# Patient Record
Sex: FEMALE | Race: WHITE | Employment: UNEMPLOYED | URBAN - METROPOLITAN AREA
[De-identification: names, ages, dates, MRNs, and addresses within clinical notes are randomized per-mention and may not be internally consistent; named-entity substitution may affect disease eponyms.]

---

## 2021-02-14 ENCOUNTER — APPOINTMENT (OUTPATIENT)
Dept: GENERAL RADIOLOGY | Age: 56
End: 2021-02-14
Attending: EMERGENCY MEDICINE
Payer: MEDICARE

## 2021-02-14 ENCOUNTER — APPOINTMENT (OUTPATIENT)
Dept: CT IMAGING | Age: 56
End: 2021-02-14
Attending: EMERGENCY MEDICINE
Payer: MEDICARE

## 2021-02-14 ENCOUNTER — HOSPITAL ENCOUNTER (EMERGENCY)
Age: 56
Discharge: HOME | End: 2021-02-15
Attending: EMERGENCY MEDICINE
Payer: MEDICARE

## 2021-02-14 DIAGNOSIS — F19.929: Primary | ICD-10-CM

## 2021-02-14 DIAGNOSIS — F19.10 POLYSUBSTANCE ABUSE (HCC): ICD-10-CM

## 2021-02-14 LAB
ALBUMIN SERPL-MCNC: 4.1 G/DL (ref 3.5–5)
ALBUMIN/GLOB SERPL: 0.9 {RATIO} (ref 1.2–3.5)
ALP SERPL-CCNC: 115 U/L (ref 50–136)
ALT SERPL-CCNC: 16 U/L (ref 12–65)
AMORPH CRY URNS QL MICRO: ABNORMAL
AMPHET UR QL SCN: NEGATIVE
ANION GAP SERPL CALC-SCNC: 3 MMOL/L (ref 7–16)
APPEARANCE UR: ABNORMAL
AST SERPL-CCNC: 17 U/L (ref 15–37)
BACTERIA URNS QL MICRO: ABNORMAL /HPF
BARBITURATES UR QL SCN: NEGATIVE
BENZODIAZ UR QL: POSITIVE
BILIRUB SERPL-MCNC: 0.7 MG/DL (ref 0.2–1.1)
BILIRUB UR QL: ABNORMAL
BUN SERPL-MCNC: 9 MG/DL (ref 6–23)
CALCIUM SERPL-MCNC: 10.1 MG/DL (ref 8.3–10.4)
CANNABINOIDS UR QL SCN: POSITIVE
CHLORIDE SERPL-SCNC: 109 MMOL/L (ref 98–107)
CO2 SERPL-SCNC: 30 MMOL/L (ref 21–32)
COCAINE UR QL SCN: POSITIVE
COLOR UR: YELLOW
CREAT SERPL-MCNC: 0.84 MG/DL (ref 0.6–1)
EPI CELLS #/AREA URNS HPF: ABNORMAL /HPF
ERYTHROCYTE [DISTWIDTH] IN BLOOD BY AUTOMATED COUNT: 13.5 % (ref 11.9–14.6)
ETHANOL SERPL-MCNC: <3 MG/DL
GLOBULIN SER CALC-MCNC: 4.6 G/DL (ref 2.3–3.5)
GLUCOSE BLD STRIP.AUTO-MCNC: 111 MG/DL (ref 65–100)
GLUCOSE SERPL-MCNC: 97 MG/DL (ref 65–100)
GLUCOSE UR STRIP.AUTO-MCNC: NEGATIVE MG/DL
HCG UR QL: NEGATIVE
HCT VFR BLD AUTO: 44.5 % (ref 35.8–46.3)
HGB BLD-MCNC: 14.2 G/DL (ref 11.7–15.4)
HGB UR QL STRIP: ABNORMAL
KETONES UR QL STRIP.AUTO: ABNORMAL MG/DL
LEUKOCYTE ESTERASE UR QL STRIP.AUTO: ABNORMAL
LIPASE SERPL-CCNC: 80 U/L (ref 73–393)
MCH RBC QN AUTO: 28.5 PG (ref 26.1–32.9)
MCHC RBC AUTO-ENTMCNC: 31.9 G/DL (ref 31.4–35)
MCV RBC AUTO: 89.4 FL (ref 79.6–97.8)
METHADONE UR QL: NEGATIVE
NITRITE UR QL STRIP.AUTO: NEGATIVE
NRBC # BLD: 0 K/UL (ref 0–0.2)
OPIATES UR QL: POSITIVE
OTHER OBSERVATIONS,UCOM: ABNORMAL
PCP UR QL: NEGATIVE
PH UR STRIP: 5.5 [PH] (ref 5–9)
PLATELET # BLD AUTO: 217 K/UL (ref 150–450)
PMV BLD AUTO: 9.5 FL (ref 9.4–12.3)
POTASSIUM SERPL-SCNC: 3.6 MMOL/L (ref 3.5–5.1)
PROT SERPL-MCNC: 8.7 G/DL (ref 6.3–8.2)
PROT UR STRIP-MCNC: NEGATIVE MG/DL
RBC # BLD AUTO: 4.98 M/UL (ref 4.05–5.2)
RBC #/AREA URNS HPF: ABNORMAL /HPF
SODIUM SERPL-SCNC: 142 MMOL/L (ref 136–145)
SP GR UR REFRACTOMETRY: >1.03 (ref 1–1.02)
UROBILINOGEN UR QL STRIP.AUTO: 1 EU/DL (ref 0.2–1)
WBC # BLD AUTO: 10.5 K/UL (ref 4.3–11.1)
WBC URNS QL MICRO: ABNORMAL /HPF

## 2021-02-14 PROCEDURE — 87086 URINE CULTURE/COLONY COUNT: CPT

## 2021-02-14 PROCEDURE — 93005 ELECTROCARDIOGRAM TRACING: CPT | Performed by: EMERGENCY MEDICINE

## 2021-02-14 PROCEDURE — 82962 GLUCOSE BLOOD TEST: CPT

## 2021-02-14 PROCEDURE — 82077 ASSAY SPEC XCP UR&BREATH IA: CPT

## 2021-02-14 PROCEDURE — 81025 URINE PREGNANCY TEST: CPT

## 2021-02-14 PROCEDURE — 81003 URINALYSIS AUTO W/O SCOPE: CPT

## 2021-02-14 PROCEDURE — 70450 CT HEAD/BRAIN W/O DYE: CPT

## 2021-02-14 PROCEDURE — 99285 EMERGENCY DEPT VISIT HI MDM: CPT

## 2021-02-14 PROCEDURE — 85027 COMPLETE CBC AUTOMATED: CPT

## 2021-02-14 PROCEDURE — 80053 COMPREHEN METABOLIC PANEL: CPT

## 2021-02-14 PROCEDURE — 71045 X-RAY EXAM CHEST 1 VIEW: CPT

## 2021-02-14 PROCEDURE — 80307 DRUG TEST PRSMV CHEM ANLYZR: CPT

## 2021-02-14 PROCEDURE — 83690 ASSAY OF LIPASE: CPT

## 2021-02-15 VITALS
TEMPERATURE: 98.1 F | RESPIRATION RATE: 23 BRPM | WEIGHT: 150 LBS | DIASTOLIC BLOOD PRESSURE: 57 MMHG | OXYGEN SATURATION: 96 % | HEIGHT: 65 IN | SYSTOLIC BLOOD PRESSURE: 110 MMHG | BODY MASS INDEX: 24.99 KG/M2 | HEART RATE: 65 BPM

## 2021-02-15 LAB
ATRIAL RATE: 78 BPM
CALCULATED P AXIS, ECG09: 59 DEGREES
CALCULATED R AXIS, ECG10: 57 DEGREES
CALCULATED T AXIS, ECG11: 28 DEGREES
DIAGNOSIS, 93000: NORMAL
P-R INTERVAL, ECG05: 156 MS
Q-T INTERVAL, ECG07: 354 MS
QRS DURATION, ECG06: 74 MS
QTC CALCULATION (BEZET), ECG08: 403 MS
VENTRICULAR RATE, ECG03: 78 BPM

## 2021-02-15 PROCEDURE — 74011250637 HC RX REV CODE- 250/637: Performed by: EMERGENCY MEDICINE

## 2021-02-15 RX ORDER — CEFPODOXIME PROXETIL 200 MG/1
200 TABLET, FILM COATED ORAL
Status: COMPLETED | OUTPATIENT
Start: 2021-02-15 | End: 2021-02-15

## 2021-02-15 RX ADMIN — CEFPODOXIME PROXETIL 200 MG: 200 TABLET, FILM COATED ORAL at 00:12

## 2021-02-15 NOTE — ED NOTES
I have reviewed discharge instructions with the patient. The patient verbalized understanding. Patient left ED via Discharge Method: ambulatory to Home with family. Opportunity for questions and clarification provided. Patient given 0 scripts. To continue your aftercare when you leave the hospital, you may receive an automated call from our care team to check in on how you are doing. This is a free service and part of our promise to provide the best care and service to meet your aftercare needs.  If you have questions, or wish to unsubscribe from this service please call [phone]. Thank you for Choosing our [facility] Emergency Department.

## 2021-02-15 NOTE — DISCHARGE INSTRUCTIONS
Follow up with your pcp to discuss substance use disorder and plan for treatment. [facility] to [facility] as well. Return if worse, any concerns.

## 2021-02-15 NOTE — ED PROVIDER NOTES
The history is provided by the patient. Altered mental status   This is a new problem. Episode onset: unknown. The problem has been gradually improving. Associated symptoms include confusion. Pertinent negatives include no weakness and no numbness. Mental status baseline is normal.         No past medical history on file. No past surgical history on file. No family history on file. Social History     Socioeconomic History    Marital status:      Spouse name: Not on file    Number of children: Not on file    Years of education: Not on file    Highest education level: Not on file   Occupational History    Not on file   Social Needs    Financial resource strain: Not on file    Food insecurity     Worry: Not on file     Inability: Not on file    Transportation needs     Medical: Not on file     Non-medical: Not on file   Tobacco Use    Smoking status: Not on file   Substance and Sexual Activity    Alcohol use: Not on file    Drug use: Not on file    Sexual activity: Not on file   Lifestyle    Physical activity     Days per week: Not on file     Minutes per session: Not on file    Stress: Not on file   Relationships    Social connections     Talks on phone: Not on file     Gets together: Not on file     Attends [employer] service: Not on file     Active member of club or organization: Not on file     Attends meetings of clubs or organizations: Not on file     Relationship status: Not on file    Intimate partner violence     Fear of current or ex partner: Not on file     Emotionally abused: Not on file     Physically abused: Not on file     Forced sexual activity: Not on file   Other Topics Concern    Not on file   Social History Narrative    Not on file         ALLERGIES: Patient has no known allergies. Review of Systems   Constitutional: Negative for chills and fever. HENT: Negative for congestion, rhinorrhea and sore throat. Eyes: Negative for photophobia and redness. Respiratory: Negative for cough and shortness of breath. Cardiovascular: Negative for chest pain and leg swelling. Gastrointestinal: Positive for abdominal pain. Negative for diarrhea, nausea and vomiting. Endocrine: Negative for polydipsia and polyuria. Genitourinary: Positive for dysuria. Negative for frequency and urgency. Musculoskeletal: Negative for back pain and myalgias. Neurological: Positive for dizziness ( Zant dizziness and movement sensation with movement of head for several months). Negative for speech difficulty, weakness, numbness and headaches. Psychiatric/Behavioral: Positive for confusion. Vitals:    02/14/21 2051   BP: (!) 102/59   Pulse: 90   Resp: 16   Temp: 98.1 °F (36.7 °C)   SpO2: 94%   Weight: 68 kg (150 lb)   Height: 5' 5\" (1.651 m)            Physical Exam  Vitals signs and nursing note reviewed. Constitutional:       General: She is not in acute distress. Appearance: She is well-developed. HENT:      Head: Normocephalic. Eyes:      General: No visual field deficit. Pupils: Pupils are equal, round, and reactive to light. Cardiovascular:      Rate and Rhythm: Normal rate and regular rhythm. Heart sounds: Normal heart sounds. Pulmonary:      Effort: Pulmonary effort is normal.      Breath sounds: Normal breath sounds. Abdominal:      General: There is no distension. Palpations: Abdomen is soft. There is no mass. Tenderness: There is no abdominal tenderness. There is no guarding or rebound. Musculoskeletal: Normal range of motion. Lymphadenopathy:      Cervical: No cervical adenopathy. Skin:     General: Skin is warm and dry. Neurological:      Mental Status: She is alert. GCS: GCS eye subscore is 4. GCS verbal subscore is 5. GCS motor subscore is 6. Cranial Nerves: No cranial nerve deficit, dysarthria or facial asymmetry. Sensory: No sensory deficit. Motor: No weakness.       Coordination: Coordination normal.          MDM  Number of Diagnoses or Management Options  Acute substance intoxication (HCC)  Polysubstance abuse (HCC)  Diagnosis management comments: Patient is now oriented x3.  She exhibits some odd behavior and answer some questions seemingly inappropriately.  No focal deficit.  Check alcohol and UDS, basic labs and abdominal labs given right upper quadrant tenderness, CT scan of the head and monitor.  Check urine for infection as well.    10:47 PM  Spoke with the patient's son about 45 min ago and he reports that she had not returned home from [facility] after leaving with the friend. He just received text messages from that friend with him needing a ride home from the [facility]. His mother was last seen normal about 5 PM. She displayed no focal deficit and her urine drug screen is positive for multiple substances. Patient's son had denied drug use on her apart but she had admitted to she did in a methadone clinic before.    12:24 AM  Patient is arousable by minor stimulus but certainly more somnolent than when she first got here.  Oxygen saturations are 91 to 92% on room air as she snores loudly.  2 L nasal cannula oxygen applied.  Will allow patient to sleep this polysubstance intoxication off.    1:57 AM  Patient is now awake and alert and admits to taking Xanax which she believes was yesterday and taking Norco today.  She denies the cocaine.  She now reports she is a former patient at [facility] and briefly tried to go to [facility] treatment centers to have her Medicaid pay for it but there was a problem with the following of the papers.  I encouraged her to call them Tuesday or [facility] and to try to get back into treatment.  Patient counseled not to take Xanax and opiates as this can be a fatal combination.  I explained that it could cause her to fall asleep stop breathing and end up dead.       Amount and/or Complexity of Data Reviewed  Clinical lab tests: ordered and reviewed  (Results for orders placed or performed during the hospital encounter of 02/14/21  -CBC W/O DIFF       Result                      Value             Ref Range           WBC                         10.5              4.3 - 11.1 K*       RBC                         4.98              4.05 - 5.2 M*       HGB                         14.2              11.7 - 15.4 *       HCT                         44.5              35.8 - 46.3 %       MCV                         89.4              79.6 - 97.8 *       MCH                         28.5              26.1 - 32.9 *       MCHC                        31.9              31.4 - 35.0 *       RDW                         13.5              11.9 - 14.6 %       PLATELET                    217               150 - 450 K/*       MPV                         9.5               9.4 - 12.3 FL       ABSOLUTE NRBC               0.00              0.0 - 0.2 K/*  -METABOLIC PANEL, COMPREHENSIVE       Result                      Value             Ref Range           Sodium                      142               136 - 145 mm*       Potassium                   3.6               3.5 - 5.1 mm*       Chloride                    109 (H)           98 - 107 mmo*       CO2                         30                21 - 32 mmol*       Anion gap                   3 (L)             7 - 16 mmol/L       Glucose                     97                65 - 100 mg/*       BUN                         9                 6 - 23 MG/DL        Creatinine                  0.84              0.6 - 1.0 MG*       GFR est AA                  >60               >60 ml/min/1*       GFR est non-AA              >60               >60 ml/min/1*       Calcium                     10.1              8.3 - 10.4 M*       Bilirubin, total            0.7               0.2 - 1.1 MG*       ALT (SGPT)                  16                12 - 65 U/L         AST (SGOT)                  17                15 - 37 U/L         Alk.  phosphatase            115 50 - 136 U/L        Protein, total              8.7 (H)           6.3 - 8.2 g/*       Albumin                     4.1               3.5 - 5.0 g/*       Globulin                    4.6 (H)           2.3 - 3.5 g/*       A-G Ratio                   0.9 (L)           1.2 - 3.5      -LIPASE       Result                      Value             Ref Range           Lipase                      80                73 - 393 U/L   -ETHYL ALCOHOL       Result                      Value             Ref Range           ALCOHOL(ETHYL),SERUM        <3                MG/DL          -DRUG SCREEN, URINE       Result                      Value             Ref Range           PCP(PHENCYCLIDINE)          Negative                              BENZODIAZEPINES             Positive                              COCAINE                     Positive                              AMPHETAMINES                Negative                              METHADONE                   Negative                              THC (TH-CANNABINOL)         Positive                              OPIATES                     Positive                              BARBITURATES                Negative                         -URINALYSIS W/ RFLX MICROSCOPIC       Result                      Value             Ref Range           Color                       YELLOW                                Appearance                  CLOUDY                                Specific gravity            >1.030 (H)        1.001 - 1.023       pH (UA)                     5.5               5.0 - 9.0           Protein                     Negative          NEG mg/dL           Glucose                     Negative          NEG mg/dL           Ketone                      TRACE (A)         NEG mg/dL           Bilirubin                   SMALL (A)         NEG                 Blood                       SMALL (A)         NEG                 Urobilinogen                1.0               0.2 - 1.0 EU*       Nitrites                    Negative          NEG                 Leukocyte Esterase          SMALL (A)         NEG                 WBC                         0-3               0 /hpf              RBC                         0-3               0 /hpf              Epithelial cells            5-10              0 /hpf              Bacteria                    4+ (H)            0 /hpf              Amorphous Crystals          3+ (H)            0                   Other observations                                            RESULTS VERIFIED MANUALLY  -HCG URINE, QL       Result                      Value             Ref Range           HCG urine, QL               Negative          NEG            -GLUCOSE, POC       Result                      Value             Ref Range           Glucose (POC)               111 (H)           65 - 100 mg/*  )  Tests in the radiology section of CPT®: ordered and reviewed (Ct Head Wo Cont    Result Date: 2/14/2021  Noncontrast CT of the brain. COMPARISON: none INDICATION: Altered mental status, confusion TECHNIQUE: Contiguous axial images were obtained from the skull base through the vertex without IV contrast. Radiation dose reduction techniques were used for this study:  Our CT scanners use one or all of the following: Automated exposure control, adjustment of the mA and/or kVp according to patient's size, iterative reconstruction. FINDINGS: There is no acute intracranial hemorrhage or evidence for acute territorial infarction. There is no mass effect, midline shift or hydrocephalus. There is no extra-axial fluid collection. The cerebellum and brainstem are grossly unremarkable. Included globes appear intact. The paranasal sinuses and the mastoid air cells are aerated. No skull fracture. 1. No CT evidence of acute intracranial process. Xr Chest Port    Result Date: 2/14/2021  Portable chest xray  COMPARISON: none.  INDICATION: Altered mental status FINDINGS: Lungs are underinflated. There is no focal pulmonary consolidation, pneumothorax or pulmonary edema. No large pleural effusion. Cardiomediastinal contour is within normal limits. Surrounding bones are unremarkable.      1. No evidence of acute pulmonary disease.    )    Risk of Complications, Morbidity, and/or Mortality  Presenting problems: moderate  Diagnostic procedures: low  Management options: low    Patient Progress  Patient progress: stable         Procedures

## 2021-02-15 NOTE — ED TRIAGE NOTES
Pt arrives via EMS, mask on. Pt was found with altered mental status in [facility]. Per EMS, [address] employees called after the patient had been wandering the store for about two hours. Employees saw the patient eating oreos and then the patient attempted to get into the wrong vehicle. Pt also had bowel movement on self while in [facility]. Per EMS, vitals are stable. Pt states she feels stressed and she has not been sleeping. Pt states she is only sleeping about 3 hours a night but napping during the day.

## 2021-02-17 LAB
BACTERIA SPEC CULT: NORMAL
SERVICE CMNT-IMP: NORMAL

## 2024-07-25 ENCOUNTER — INITIAL CONSULT (OUTPATIENT)
Age: 59
End: 2024-07-25
Payer: MEDICARE

## 2024-07-25 VITALS
HEIGHT: 65 IN | BODY MASS INDEX: 32.72 KG/M2 | HEART RATE: 56 BPM | DIASTOLIC BLOOD PRESSURE: 70 MMHG | SYSTOLIC BLOOD PRESSURE: 124 MMHG | WEIGHT: 196.4 LBS

## 2024-07-25 DIAGNOSIS — J40 BRONCHITIS: Primary | ICD-10-CM

## 2024-07-25 DIAGNOSIS — R06.02 SHORTNESS OF BREATH: ICD-10-CM

## 2024-07-25 PROCEDURE — G8427 DOCREV CUR MEDS BY ELIG CLIN: HCPCS | Performed by: INTERNAL MEDICINE

## 2024-07-25 PROCEDURE — 3017F COLORECTAL CA SCREEN DOC REV: CPT | Performed by: INTERNAL MEDICINE

## 2024-07-25 PROCEDURE — 4004F PT TOBACCO SCREEN RCVD TLK: CPT | Performed by: INTERNAL MEDICINE

## 2024-07-25 PROCEDURE — 99204 OFFICE O/P NEW MOD 45 MIN: CPT | Performed by: INTERNAL MEDICINE

## 2024-07-25 PROCEDURE — 93000 ELECTROCARDIOGRAM COMPLETE: CPT | Performed by: INTERNAL MEDICINE

## 2024-07-25 PROCEDURE — G8417 CALC BMI ABV UP PARAM F/U: HCPCS | Performed by: INTERNAL MEDICINE

## 2024-07-25 RX ORDER — ALBUTEROL SULFATE 90 UG/1
2 AEROSOL, METERED RESPIRATORY (INHALATION) EVERY 6 HOURS PRN
COMMUNITY
Start: 2022-08-05

## 2024-07-25 RX ORDER — METHOCARBAMOL 750 MG/1
750 TABLET, FILM COATED ORAL 2 TIMES DAILY PRN
COMMUNITY
Start: 2022-05-26

## 2024-07-25 RX ORDER — IPRATROPIUM BROMIDE AND ALBUTEROL SULFATE 2.5; .5 MG/3ML; MG/3ML
SOLUTION RESPIRATORY (INHALATION)
COMMUNITY
Start: 2024-06-17

## 2024-07-25 RX ORDER — ONDANSETRON 4 MG/1
4 TABLET, ORALLY DISINTEGRATING ORAL 3 TIMES DAILY PRN
COMMUNITY
Start: 2023-09-25

## 2024-07-30 ASSESSMENT — ENCOUNTER SYMPTOMS
ABDOMINAL PAIN: 0
SHORTNESS OF BREATH: 0

## 2024-07-30 NOTE — PROGRESS NOTES
function and as well as measure filling pressures.  Will arrange follow-up pending results of her echo.    Personal time spent with chart review, interview/physical examination and documentation totals 46 minutes.         No follow-ups on file.          Thank you for allowing me to participate in this patient's care.  Please call or contact me if there are any questions or concerns regarding the above.      Reinier Riley III, MD  07/30/24  8:29 AM

## 2024-08-07 ENCOUNTER — OFFICE VISIT (OUTPATIENT)
Dept: INTERNAL MEDICINE CLINIC | Facility: CLINIC | Age: 59
End: 2024-08-07
Payer: MEDICARE

## 2024-08-07 VITALS
TEMPERATURE: 98 F | SYSTOLIC BLOOD PRESSURE: 112 MMHG | HEART RATE: 74 BPM | BODY MASS INDEX: 32.82 KG/M2 | OXYGEN SATURATION: 95 % | HEIGHT: 65 IN | WEIGHT: 197 LBS | DIASTOLIC BLOOD PRESSURE: 78 MMHG

## 2024-08-07 DIAGNOSIS — R63.5 ABNORMAL WEIGHT GAIN: ICD-10-CM

## 2024-08-07 DIAGNOSIS — Z12.11 SCREENING FOR COLON CANCER: ICD-10-CM

## 2024-08-07 DIAGNOSIS — Z13.6 SCREENING FOR CARDIOVASCULAR CONDITION: ICD-10-CM

## 2024-08-07 DIAGNOSIS — Z12.31 ENCOUNTER FOR SCREENING MAMMOGRAM FOR MALIGNANT NEOPLASM OF BREAST: ICD-10-CM

## 2024-08-07 DIAGNOSIS — Z00.00 ROUTINE ADULT HEALTH MAINTENANCE: ICD-10-CM

## 2024-08-07 DIAGNOSIS — G47.33 OSA (OBSTRUCTIVE SLEEP APNEA): Primary | ICD-10-CM

## 2024-08-07 PROCEDURE — 3017F COLORECTAL CA SCREEN DOC REV: CPT | Performed by: INTERNAL MEDICINE

## 2024-08-07 PROCEDURE — G8427 DOCREV CUR MEDS BY ELIG CLIN: HCPCS | Performed by: INTERNAL MEDICINE

## 2024-08-07 PROCEDURE — 4004F PT TOBACCO SCREEN RCVD TLK: CPT | Performed by: INTERNAL MEDICINE

## 2024-08-07 PROCEDURE — G8417 CALC BMI ABV UP PARAM F/U: HCPCS | Performed by: INTERNAL MEDICINE

## 2024-08-07 PROCEDURE — 99204 OFFICE O/P NEW MOD 45 MIN: CPT | Performed by: INTERNAL MEDICINE

## 2024-08-07 RX ORDER — BENZONATATE 200 MG/1
200 CAPSULE ORAL 3 TIMES DAILY PRN
COMMUNITY
Start: 2024-06-20

## 2024-08-07 RX ORDER — PEAK FLOW METER
EACH MISCELLANEOUS
COMMUNITY
Start: 2024-06-20

## 2024-08-07 SDOH — ECONOMIC STABILITY: HOUSING INSECURITY
IN THE LAST 12 MONTHS, WAS THERE A TIME WHEN YOU DID NOT HAVE A STEADY PLACE TO SLEEP OR SLEPT IN A SHELTER (INCLUDING NOW)?: NO

## 2024-08-07 SDOH — ECONOMIC STABILITY: INCOME INSECURITY: HOW HARD IS IT FOR YOU TO PAY FOR THE VERY BASICS LIKE FOOD, HOUSING, MEDICAL CARE, AND HEATING?: NOT HARD AT ALL

## 2024-08-07 SDOH — ECONOMIC STABILITY: FOOD INSECURITY: WITHIN THE PAST 12 MONTHS, THE FOOD YOU BOUGHT JUST DIDN'T LAST AND YOU DIDN'T HAVE MONEY TO GET MORE.: NEVER TRUE

## 2024-08-07 SDOH — ECONOMIC STABILITY: FOOD INSECURITY: WITHIN THE PAST 12 MONTHS, YOU WORRIED THAT YOUR FOOD WOULD RUN OUT BEFORE YOU GOT MONEY TO BUY MORE.: NEVER TRUE

## 2024-08-07 ASSESSMENT — PATIENT HEALTH QUESTIONNAIRE - PHQ9
SUM OF ALL RESPONSES TO PHQ9 QUESTIONS 1 & 2: 0
SUM OF ALL RESPONSES TO PHQ QUESTIONS 1-9: 0
2. FEELING DOWN, DEPRESSED OR HOPELESS: NOT AT ALL
SUM OF ALL RESPONSES TO PHQ QUESTIONS 1-9: 0
1. LITTLE INTEREST OR PLEASURE IN DOING THINGS: NOT AT ALL

## 2024-08-07 NOTE — PROGRESS NOTES
Multiple Vitamin (MULTIVITAMIN ADULT PO), Take by mouth, Disp: , Rfl:     ondansetron (ZOFRAN-ODT) 4 MG disintegrating tablet, Take 1 tablet by mouth 3 times daily as needed, Disp: , Rfl:     Allergies   Allergen Reactions    Acetaminophen Other (See Comments)     Does not have an allergy per patient the doctor doesn't want her to have it bc she has a mass on her liver.    Sulfa Antibiotics Nausea And Vomiting    Hydrocodone Other (See Comments)     Tylenol due to liver condition         Review of Systems      Vitals:    08/07/24 1349   BP: 112/78   Pulse: 74   Temp: 98 °F (36.7 °C)   TempSrc: Temporal   SpO2: 95%   Weight: 89.4 kg (197 lb)   Height: 1.651 m (5' 5\")       Wt Readings from Last 3 Encounters:   08/07/24 89.4 kg (197 lb)   07/25/24 89.1 kg (196 lb 6.4 oz)         Physical Exam  Constitutional:       Appearance: Normal appearance. She is not ill-appearing.   HENT:      Head: Normocephalic.      Right Ear: Tympanic membrane normal.      Left Ear: Tympanic membrane normal.      Nose: Nose normal.      Mouth/Throat:      Mouth: Mucous membranes are moist.      Pharynx: Oropharynx is clear.   Eyes:      Extraocular Movements: Extraocular movements intact.      Conjunctiva/sclera: Conjunctivae normal.   Cardiovascular:      Rate and Rhythm: Normal rate and regular rhythm.      Pulses: Normal pulses.      Heart sounds: Normal heart sounds.   Pulmonary:      Effort: Pulmonary effort is normal.      Breath sounds: Normal breath sounds.   Abdominal:      General: Abdomen is flat. Bowel sounds are normal. There is no distension.      Palpations: Abdomen is soft. There is no mass.   Musculoskeletal:      Right lower leg: No edema.      Left lower leg: No edema.   Skin:     Coloration: Skin is not jaundiced.   Neurological:      General: No focal deficit present.      Mental Status: She is alert.   Psychiatric:         Mood and Affect: Mood normal.         Thought Content: Thought content normal.

## 2024-09-03 ENCOUNTER — HOSPITAL ENCOUNTER (OUTPATIENT)
Dept: SLEEP MEDICINE | Age: 59
Discharge: HOME | End: 2024-09-06

## 2024-09-04 ENCOUNTER — TELEPHONE (OUTPATIENT)
Age: 59
End: 2024-09-04

## 2024-09-04 NOTE — TELEPHONE ENCOUNTER
----- Message from Dr. Reinier Riley MD sent at 9/4/2024 11:32 AM EDT -----  Please let patient know that their echo was normal. Patient can follow up as scheduled.

## 2024-10-15 ENCOUNTER — TELEPHONE (OUTPATIENT)
Dept: SLEEP MEDICINE | Age: 59
End: 2024-10-15

## 2024-10-16 ENCOUNTER — OFFICE VISIT (OUTPATIENT)
Dept: SLEEP MEDICINE | Age: 59
End: 2024-10-16
Payer: MEDICARE

## 2024-10-16 VITALS
HEIGHT: 65 IN | HEART RATE: 75 BPM | OXYGEN SATURATION: 94 % | WEIGHT: 188 LBS | RESPIRATION RATE: 14 BRPM | DIASTOLIC BLOOD PRESSURE: 88 MMHG | SYSTOLIC BLOOD PRESSURE: 134 MMHG | BODY MASS INDEX: 31.32 KG/M2

## 2024-10-16 DIAGNOSIS — G47.8 NON-RESTORATIVE SLEEP: ICD-10-CM

## 2024-10-16 DIAGNOSIS — G47.33 OSA (OBSTRUCTIVE SLEEP APNEA): Primary | ICD-10-CM

## 2024-10-16 DIAGNOSIS — G47.10 HYPERSOMNIA: ICD-10-CM

## 2024-10-16 DIAGNOSIS — R51.9 MORNING HEADACHE: ICD-10-CM

## 2024-10-16 DIAGNOSIS — E66.811 OBESITY (BMI 30.0-34.9): ICD-10-CM

## 2024-10-16 DIAGNOSIS — G47.00 PERSISTENT DISORDER OF INITIATING OR MAINTAINING SLEEP: ICD-10-CM

## 2024-10-16 PROCEDURE — G8417 CALC BMI ABV UP PARAM F/U: HCPCS | Performed by: NURSE PRACTITIONER

## 2024-10-16 PROCEDURE — G2211 COMPLEX E/M VISIT ADD ON: HCPCS | Performed by: NURSE PRACTITIONER

## 2024-10-16 PROCEDURE — 4004F PT TOBACCO SCREEN RCVD TLK: CPT | Performed by: NURSE PRACTITIONER

## 2024-10-16 PROCEDURE — 99203 OFFICE O/P NEW LOW 30 MIN: CPT | Performed by: NURSE PRACTITIONER

## 2024-10-16 PROCEDURE — 3017F COLORECTAL CA SCREEN DOC REV: CPT | Performed by: NURSE PRACTITIONER

## 2024-10-16 PROCEDURE — G8484 FLU IMMUNIZE NO ADMIN: HCPCS | Performed by: NURSE PRACTITIONER

## 2024-10-16 PROCEDURE — G8427 DOCREV CUR MEDS BY ELIG CLIN: HCPCS | Performed by: NURSE PRACTITIONER

## 2024-10-16 ASSESSMENT — SLEEP AND FATIGUE QUESTIONNAIRES
ESS TOTAL SCORE: 14
HOW LIKELY ARE YOU TO NOD OFF OR FALL ASLEEP WHILE SITTING AND READING: HIGH CHANCE OF DOZING
HOW LIKELY ARE YOU TO NOD OFF OR FALL ASLEEP WHILE LYING DOWN TO REST IN THE AFTERNOON WHEN CIRCUMSTANCES PERMIT: SLIGHT CHANCE OF DOZING
HOW LIKELY ARE YOU TO NOD OFF OR FALL ASLEEP WHILE SITTING QUIETLY AFTER LUNCH WITHOUT ALCOHOL: MODERATE CHANCE OF DOZING
HOW LIKELY ARE YOU TO NOD OFF OR FALL ASLEEP WHILE SITTING AND TALKING TO SOMEONE: WOULD NEVER DOZE
HOW LIKELY ARE YOU TO NOD OFF OR FALL ASLEEP WHEN YOU ARE A PASSENGER IN A CAR FOR AN HOUR WITHOUT A BREAK: HIGH CHANCE OF DOZING
HOW LIKELY ARE YOU TO NOD OFF OR FALL ASLEEP WHILE SITTING INACTIVE IN A PUBLIC PLACE: MODERATE CHANCE OF DOZING
HOW LIKELY ARE YOU TO NOD OFF OR FALL ASLEEP WHILE WATCHING TV: HIGH CHANCE OF DOZING
HOW LIKELY ARE YOU TO NOD OFF OR FALL ASLEEP IN A CAR, WHILE STOPPED FOR A FEW MINUTES IN TRAFFIC: WOULD NEVER DOZE

## 2024-10-16 NOTE — PROGRESS NOTES
apnea). Diagnoses of Non-restorative sleep, Morning headache, Hypersomnia, Persistent disorder of initiating or maintaining sleep, and Obesity (BMI 30.0-34.9) were also pertinent to this visit.          (  X   )New Set-Up     CPAP machine   (     ) CPAP Unit  ( x    ) Auto CPAP Unit  (     ) BiLevel Unit  (     ) Auto BiLevel Unit  (     ) ASV   (     ) Bilevel ST    (     ) Oxygen Concentrator         Length of need: 12 months    Pressure: 5-15  cmH20  EPR: 2     Starting Ramp Pressure:   cm H20  Ramp Time: min      Patient had a diagnostic Apnea Hypopnea Index (AHI) of :  14.5    *SUPPLIES* Replace all as needed, or per coverage guidelines     Masks Type:    (  x   ) -Full Face Mask (1 per 3 mon)  ( x    ) -Full Mask (1 per month) Interface/Cushion      ( x    ) -Nasal Mask (1 per 3 mon)  ( x    ) - Nasal Mask (1 per month) Interface/Cushion  ( x    ) -Pillow (2 per mon)  ( x    ) -Inzxjbckb (1 per 6 mon)      _________________________________________________________________          Other Supplies:    (  X   )-Huwzbcel (1 per 6 mon)  ( X    )-Uujsyu Tubing (1 per 3 mon)  (  X   )- Disposable Filter (2 per mon)  (   X  )-Oykxbd Humidifier (1 per year)     (  x   )-Xikoeddjo (sometimes used with Full Face Mask) (1 per 6 mos)  (     )-Tubing-without heat (1 per 3 mos)  ( X   )-Non-Disposable Filter (1 per 6 mos)  (   x  )-Water Chamber (1 per 6 mos)  (     )-Humidifier non-heated (1 per 5 yrs)      Signed Date: 10/16/2024  Electronically Signed By: Ricardo K Merck, APRN - CNP              Collaborating Physician: Dr. Mancia     Today's office visit was spent  reviewing test results, prognosis, importance of compliance, education about disease process, benefits of medications, instructions for management of acute flare-ups, and follow up plans.  Total time spent with patient was 35 minutes.    Ricardo Girard,

## 2024-11-05 ENCOUNTER — OFFICE VISIT (OUTPATIENT)
Dept: INTERNAL MEDICINE CLINIC | Facility: CLINIC | Age: 59
End: 2024-11-05

## 2024-11-05 VITALS
OXYGEN SATURATION: 98 % | WEIGHT: 194 LBS | DIASTOLIC BLOOD PRESSURE: 60 MMHG | TEMPERATURE: 99 F | SYSTOLIC BLOOD PRESSURE: 102 MMHG | HEART RATE: 48 BPM | HEIGHT: 65 IN | BODY MASS INDEX: 32.32 KG/M2

## 2024-11-05 DIAGNOSIS — R11.0 NAUSEA: Primary | ICD-10-CM

## 2024-11-05 DIAGNOSIS — F32.A DEPRESSION, UNSPECIFIED DEPRESSION TYPE: ICD-10-CM

## 2024-11-05 DIAGNOSIS — Z12.31 ENCOUNTER FOR SCREENING MAMMOGRAM FOR MALIGNANT NEOPLASM OF BREAST: ICD-10-CM

## 2024-11-05 DIAGNOSIS — G47.33 OSA (OBSTRUCTIVE SLEEP APNEA): ICD-10-CM

## 2024-11-05 DIAGNOSIS — Z13.6 SCREENING FOR CARDIOVASCULAR CONDITION: ICD-10-CM

## 2024-11-05 DIAGNOSIS — M79.7 FIBROMYALGIA: ICD-10-CM

## 2024-11-05 DIAGNOSIS — M54.32 SCIATICA OF LEFT SIDE: ICD-10-CM

## 2024-11-05 DIAGNOSIS — Z00.00 ROUTINE ADULT HEALTH MAINTENANCE: ICD-10-CM

## 2024-11-05 DIAGNOSIS — R63.5 ABNORMAL WEIGHT GAIN: ICD-10-CM

## 2024-11-05 LAB
ALBUMIN SERPL-MCNC: 3.4 G/DL (ref 3.5–5)
ALBUMIN/GLOB SERPL: 1.1 (ref 1–1.9)
ALP SERPL-CCNC: 86 U/L (ref 35–104)
ALT SERPL-CCNC: 18 U/L (ref 8–45)
ANION GAP SERPL CALC-SCNC: 7 MMOL/L (ref 7–16)
AST SERPL-CCNC: 22 U/L (ref 15–37)
BILIRUB SERPL-MCNC: 0.2 MG/DL (ref 0–1.2)
BUN SERPL-MCNC: 12 MG/DL (ref 6–23)
CALCIUM SERPL-MCNC: 9.1 MG/DL (ref 8.8–10.2)
CHLORIDE SERPL-SCNC: 102 MMOL/L (ref 98–107)
CHOLEST SERPL-MCNC: 185 MG/DL (ref 0–200)
CO2 SERPL-SCNC: 31 MMOL/L (ref 20–29)
CREAT SERPL-MCNC: 0.53 MG/DL (ref 0.6–1.1)
ERYTHROCYTE [DISTWIDTH] IN BLOOD BY AUTOMATED COUNT: 14.4 % (ref 11.9–14.6)
GLOBULIN SER CALC-MCNC: 3.2 G/DL (ref 2.3–3.5)
GLUCOSE SERPL-MCNC: 89 MG/DL (ref 70–99)
HCT VFR BLD AUTO: 38.2 % (ref 35.8–46.3)
HCV AB SER QL: NONREACTIVE
HDLC SERPL-MCNC: 44 MG/DL (ref 40–60)
HDLC SERPL: 4.2 (ref 0–5)
HGB BLD-MCNC: 12.3 G/DL (ref 11.7–15.4)
HIV 1+2 AB+HIV1 P24 AG SERPL QL IA: NONREACTIVE
HIV 1/2 RESULT COMMENT: NORMAL
LDLC SERPL CALC-MCNC: 79 MG/DL (ref 0–100)
MCH RBC QN AUTO: 29.7 PG (ref 26.1–32.9)
MCHC RBC AUTO-ENTMCNC: 32.2 G/DL (ref 31.4–35)
MCV RBC AUTO: 92.3 FL (ref 82–102)
NRBC # BLD: 0 K/UL (ref 0–0.2)
PLATELET # BLD AUTO: 182 K/UL (ref 150–450)
PMV BLD AUTO: 10.6 FL (ref 9.4–12.3)
POTASSIUM SERPL-SCNC: 4.1 MMOL/L (ref 3.5–5.1)
PROT SERPL-MCNC: 6.6 G/DL (ref 6.3–8.2)
RBC # BLD AUTO: 4.14 M/UL (ref 4.05–5.2)
SODIUM SERPL-SCNC: 141 MMOL/L (ref 136–145)
TRIGL SERPL-MCNC: 309 MG/DL (ref 0–150)
TSH W FREE THYROID IF ABNORMAL: 1.92 UIU/ML (ref 0.27–4.2)
VLDLC SERPL CALC-MCNC: 62 MG/DL (ref 6–23)
WBC # BLD AUTO: 8.4 K/UL (ref 4.3–11.1)

## 2024-11-05 RX ORDER — DULOXETIN HYDROCHLORIDE 30 MG/1
30 CAPSULE, DELAYED RELEASE ORAL DAILY
Qty: 30 CAPSULE | Refills: 3 | Status: SHIPPED | OUTPATIENT
Start: 2024-11-05

## 2024-11-05 RX ORDER — ONDANSETRON 4 MG/1
4 TABLET, ORALLY DISINTEGRATING ORAL EVERY 8 HOURS PRN
Qty: 15 TABLET | Refills: 5 | Status: SHIPPED | OUTPATIENT
Start: 2024-11-05

## 2024-11-05 ASSESSMENT — ENCOUNTER SYMPTOMS
CHEST TIGHTNESS: 0
WHEEZING: 0
ABDOMINAL PAIN: 0
BLOOD IN STOOL: 0
NAUSEA: 1
COLOR CHANGE: 0
SHORTNESS OF BREATH: 0
BACK PAIN: 1

## 2024-11-05 ASSESSMENT — PATIENT HEALTH QUESTIONNAIRE - PHQ9
SUM OF ALL RESPONSES TO PHQ9 QUESTIONS 1 & 2: 2
SUM OF ALL RESPONSES TO PHQ QUESTIONS 1-9: 2
1. LITTLE INTEREST OR PLEASURE IN DOING THINGS: SEVERAL DAYS
2. FEELING DOWN, DEPRESSED OR HOPELESS: SEVERAL DAYS

## 2024-11-05 ASSESSMENT — LIFESTYLE VARIABLES
HOW OFTEN DO YOU HAVE A DRINK CONTAINING ALCOHOL: MONTHLY OR LESS
HOW MANY STANDARD DRINKS CONTAINING ALCOHOL DO YOU HAVE ON A TYPICAL DAY: 1 OR 2

## 2024-11-05 NOTE — PROGRESS NOTES
Tea was seen today for follow-up, medication refill, back pain and medicare awv.    Diagnoses and all orders for this visit:    Nausea  -     ondansetron (ZOFRAN-ODT) 4 MG disintegrating tablet; Take 1 tablet by mouth every 8 hours as needed for Nausea    Encounter for screening mammogram for malignant neoplasm of breast  -     [facility] DIGITAL SCREEN W OR WO CAD BILATERAL; Future    Sciatica of left side  -     [facility] - Physical Therapy, [facility]    Fibromyalgia  -     [facility] - Physical Therapy, [facility]  -     DULoxetine (CYMBALTA) 30 MG extended release capsule; Take 1 capsule by mouth daily    Depression, unspecified depression type  -     DULoxetine (CYMBALTA) 30 MG extended release capsule; Take 1 capsule by mouth daily    MARCOS (obstructive sleep apnea)  -     HIV 1/2 Ag/Ab, 4TH Generation,W Rflx Confirm  -     Hepatitis C Antibody  -     Lipid Panel  -     CBC  -     Comprehensive Metabolic Panel  -     TSH with Reflex    Routine adult health maintenance  -     HIV 1/2 Ag/Ab, 4TH Generation,W Rflx Confirm  -     Hepatitis C Antibody  -     Lipid Panel  -     CBC  -     Comprehensive Metabolic Panel  -     TSH with Reflex    Screening for cardiovascular condition  -     HIV 1/2 Ag/Ab, 4TH Generation,W Rflx Confirm  -     Hepatitis C Antibody  -     Lipid Panel  -     CBC  -     Comprehensive Metabolic Panel  -     TSH with Reflex    Abnormal weight gain  -     TSH with Reflex          Tea Lee is a 59 y.o. female    Chief Complaint   Patient presents with    Follow-up     3 month check up    Medication Refill     Zofran,chronic nausea,certain foods    Back Pain     Down left leg    Medicare AWV   Marcos waiting on machine.some am headaches        Ancillary Procedure on 08/30/2024   Component Date Value Ref Range Status    IVSd 08/30/2024 0.8  0.6 - 0.9 cm Final    LVIDd 08/30/2024 5.4 (A)  3.9 - 5.3 cm Final    LVIDs 08/30/2024 3.9  cm Final    LVOT Diameter 08/30/2024

## 2024-12-04 ENCOUNTER — OFFICE VISIT (OUTPATIENT)
Dept: INTERNAL MEDICINE CLINIC | Facility: CLINIC | Age: 59
End: 2024-12-04

## 2024-12-04 VITALS
DIASTOLIC BLOOD PRESSURE: 50 MMHG | OXYGEN SATURATION: 94 % | SYSTOLIC BLOOD PRESSURE: 140 MMHG | WEIGHT: 190 LBS | TEMPERATURE: 98 F | HEART RATE: 52 BPM | BODY MASS INDEX: 31.65 KG/M2 | HEIGHT: 65 IN

## 2024-12-04 DIAGNOSIS — J40 BRONCHITIS: ICD-10-CM

## 2024-12-04 DIAGNOSIS — R50.9 FEVER, UNSPECIFIED FEVER CAUSE: Primary | ICD-10-CM

## 2024-12-04 DIAGNOSIS — R52 GENERALIZED BODY ACHES: ICD-10-CM

## 2024-12-04 DIAGNOSIS — J10.1 INFLUENZA A: ICD-10-CM

## 2024-12-04 DIAGNOSIS — M79.7 FIBROMYALGIA: ICD-10-CM

## 2024-12-04 DIAGNOSIS — E86.0 DEHYDRATION: ICD-10-CM

## 2024-12-04 DIAGNOSIS — R05.9 COUGH, UNSPECIFIED TYPE: ICD-10-CM

## 2024-12-04 LAB
EXP DATE SOLUTION: NORMAL
EXP DATE SWAB: NORMAL
EXPIRATION DATE: NORMAL
INFLUENZA A ANTIGEN, POC: POSITIVE
INFLUENZA B ANTIGEN, POC: NEGATIVE
LOT NUMBER POC: NORMAL
LOT NUMBER SOLUTION: NORMAL
LOT NUMBER SWAB: NORMAL
SARS-COV-2 RNA, POC: NEGATIVE
VALID INTERNAL CONTROL, POC: YES

## 2024-12-04 RX ORDER — METHYLPREDNISOLONE 4 MG/1
TABLET ORAL
Qty: 1 KIT | Refills: 0 | Status: SHIPPED | OUTPATIENT
Start: 2024-12-04 | End: 2024-12-10

## 2024-12-04 RX ORDER — OSELTAMIVIR PHOSPHATE 75 MG/1
75 CAPSULE ORAL 2 TIMES DAILY
Qty: 10 CAPSULE | Refills: 0 | Status: SHIPPED | OUTPATIENT
Start: 2024-12-04 | End: 2024-12-09

## 2024-12-04 RX ORDER — BENZONATATE 100 MG/1
100-200 CAPSULE ORAL 3 TIMES DAILY PRN
Qty: 60 CAPSULE | Refills: 0 | Status: SHIPPED | OUTPATIENT
Start: 2024-12-04 | End: 2024-12-14

## 2024-12-04 RX ORDER — AZITHROMYCIN 250 MG/1
TABLET, FILM COATED ORAL
Qty: 6 TABLET | Refills: 0 | Status: SHIPPED | OUTPATIENT
Start: 2024-12-04 | End: 2024-12-14

## 2024-12-04 ASSESSMENT — ENCOUNTER SYMPTOMS
DIARRHEA: 0
ABDOMINAL PAIN: 0
NAUSEA: 1
VOMITING: 1

## 2024-12-04 NOTE — PROGRESS NOTES
Tea was seen today for follow-up, fever, cough and wheezing.    Diagnoses and all orders for this visit:    Fever, unspecified fever cause  -     AMB POC COVID-19 COV  -     AMB POC INFLUENZA A  AND B REAL-TIME RT-PCR    Cough, unspecified type  -     AMB POC COVID-19 COV  -     AMB POC INFLUENZA A  AND B REAL-TIME RT-PCR    Generalized body aches  -     AMB POC COVID-19 COV  -     AMB POC INFLUENZA A  AND B REAL-TIME RT-PCR    Fibromyalgia    Influenza A    Bronchitis    Dehydration    Other orders  -     oseltamivir (TAMIFLU) 75 MG capsule; Take 1 capsule by mouth 2 times daily for 5 days  -     azithromycin (ZITHROMAX) 250 MG tablet; 500mg on day 1 followed by 250mg on days 2 - 5  -     benzonatate (TESSALON) 100 MG capsule; Take 1-2 capsules by mouth 3 times daily as needed for Cough  -     methylPREDNISolone (MEDROL DOSEPACK) 4 MG tablet; Take by mouth. 1pak as directed    Advised if not better or getting worse go to ER  Needs push fluids , take meds if not possible go to ER      Tea Lee is a 59 y.o. female    Chief Complaint   Patient presents with    Follow-up     1 month    Fever     12/1/24 pm    Cough    Wheezing   No known sick contact went to short trip woke up sick cough    Not smoked since 4 days. Cough   Office Visit on 11/05/2024   Component Date Value Ref Range Status    HIV 1/2 Interp 11/05/2024 NONREACTIVE  NONREACTIVE   Final    HIV 1/2 Result Comment 11/05/2024 While this assay is highly sensitive, a non-reactive result for HIV antibodies HIV-1 and HIV-2 and p24 antigen does not preclude the possiblity of exposure to or infection with HIV.    Final    Test performed using the Roche Elecys HIV DUO assay.    Hepatitis C Ab 11/05/2024 NONREACTIVE  NONREACTIVE   Final    Cholesterol, Total 11/05/2024 185  0 - 200 MG/DL Final    Comment: Borderline High: 200-239 mg/dL  High: Greater than or equal to 240 mg/dL      Triglycerides 11/05/2024 309 (H)  0 - 150 MG/DL Final    Comment:

## 2025-01-28 ENCOUNTER — HOSPITAL ENCOUNTER (EMERGENCY)
Age: 60
Discharge: LEFT BEFORE DISCHARGE | End: 2025-01-28
Payer: MEDICARE

## 2025-01-28 ENCOUNTER — APPOINTMENT (OUTPATIENT)
Dept: GENERAL RADIOLOGY | Age: 60
End: 2025-01-28
Payer: MEDICARE

## 2025-01-28 ENCOUNTER — OFFICE VISIT (OUTPATIENT)
Dept: INTERNAL MEDICINE CLINIC | Facility: CLINIC | Age: 60
End: 2025-01-28

## 2025-01-28 VITALS
WEIGHT: 193 LBS | OXYGEN SATURATION: 95 % | SYSTOLIC BLOOD PRESSURE: 130 MMHG | BODY MASS INDEX: 32.15 KG/M2 | HEART RATE: 77 BPM | HEIGHT: 65 IN | DIASTOLIC BLOOD PRESSURE: 80 MMHG | TEMPERATURE: 97.3 F

## 2025-01-28 VITALS
SYSTOLIC BLOOD PRESSURE: 173 MMHG | OXYGEN SATURATION: 96 % | HEART RATE: 73 BPM | TEMPERATURE: 97.9 F | RESPIRATION RATE: 17 BRPM | DIASTOLIC BLOOD PRESSURE: 104 MMHG

## 2025-01-28 DIAGNOSIS — B33.8 RESPIRATORY SYNCYTIAL VIRUS (RSV): Primary | ICD-10-CM

## 2025-01-28 DIAGNOSIS — R05.9 COUGH, UNSPECIFIED TYPE: ICD-10-CM

## 2025-01-28 DIAGNOSIS — J21.0 RSV (ACUTE BRONCHIOLITIS DUE TO RESPIRATORY SYNCYTIAL VIRUS): Primary | ICD-10-CM

## 2025-01-28 DIAGNOSIS — Z53.21 ELOPED FROM EMERGENCY DEPARTMENT: ICD-10-CM

## 2025-01-28 DIAGNOSIS — R50.9 FEVER, UNSPECIFIED FEVER CAUSE: ICD-10-CM

## 2025-01-28 DIAGNOSIS — R06.2 WHEEZING: ICD-10-CM

## 2025-01-28 LAB
ALBUMIN SERPL-MCNC: 3.8 G/DL (ref 3.5–5)
ALBUMIN/GLOB SERPL: 1 (ref 1–1.9)
ALP SERPL-CCNC: 96 U/L (ref 35–104)
ALT SERPL-CCNC: 24 U/L (ref 8–45)
ANION GAP SERPL CALC-SCNC: 11 MMOL/L (ref 7–16)
AST SERPL-CCNC: 26 U/L (ref 15–37)
BASOPHILS # BLD: 0.07 K/UL (ref 0–0.2)
BASOPHILS NFR BLD: 0.7 % (ref 0–2)
BILIRUB SERPL-MCNC: 0.3 MG/DL (ref 0–1.2)
BUN SERPL-MCNC: 14 MG/DL (ref 6–23)
CALCIUM SERPL-MCNC: 9.6 MG/DL (ref 8.8–10.2)
CHLORIDE SERPL-SCNC: 101 MMOL/L (ref 98–107)
CO2 SERPL-SCNC: 29 MMOL/L (ref 20–29)
CREAT SERPL-MCNC: 0.69 MG/DL (ref 0.6–1.1)
DIFFERENTIAL METHOD BLD: NORMAL
EOSINOPHIL # BLD: 0.34 K/UL (ref 0–0.8)
EOSINOPHIL NFR BLD: 3.5 % (ref 0.5–7.8)
ERYTHROCYTE [DISTWIDTH] IN BLOOD BY AUTOMATED COUNT: 13.6 % (ref 11.9–14.6)
EXP DATE SOLUTION: NORMAL
EXP DATE SWAB: NORMAL
EXPIRATION DATE: NORMAL
GLOBULIN SER CALC-MCNC: 3.9 G/DL (ref 2.3–3.5)
GLUCOSE SERPL-MCNC: 81 MG/DL (ref 70–99)
HCT VFR BLD AUTO: 44.1 % (ref 35.8–46.3)
HGB BLD-MCNC: 14.2 G/DL (ref 11.7–15.4)
IMM GRANULOCYTES # BLD AUTO: 0.03 K/UL (ref 0–0.5)
IMM GRANULOCYTES NFR BLD AUTO: 0.3 % (ref 0–5)
INFLUENZA A ANTIGEN, POC: NEGATIVE
INFLUENZA B ANTIGEN, POC: NEGATIVE
LOT NUMBER POC: NORMAL
LOT NUMBER SOLUTION: NORMAL
LOT NUMBER SWAB: NORMAL
LYMPHOCYTES # BLD: 3.56 K/UL (ref 0.5–4.6)
LYMPHOCYTES NFR BLD: 37.1 % (ref 13–44)
MCH RBC QN AUTO: 29.2 PG (ref 26.1–32.9)
MCHC RBC AUTO-ENTMCNC: 32.2 G/DL (ref 31.4–35)
MCV RBC AUTO: 90.6 FL (ref 82–102)
MONOCYTES # BLD: 0.55 K/UL (ref 0.1–1.3)
MONOCYTES NFR BLD: 5.7 % (ref 4–12)
NEUTS SEG # BLD: 5.05 K/UL (ref 1.7–8.2)
NEUTS SEG NFR BLD: 52.7 % (ref 43–78)
NRBC # BLD: 0 K/UL (ref 0–0.2)
PLATELET # BLD AUTO: 167 K/UL (ref 150–450)
PLATELET COMMENT: NORMAL
PMV BLD AUTO: 11.5 FL (ref 9.4–12.3)
POTASSIUM SERPL-SCNC: 4.7 MMOL/L (ref 3.5–5.1)
PROT SERPL-MCNC: 7.7 G/DL (ref 6.3–8.2)
RBC # BLD AUTO: 4.87 M/UL (ref 4.05–5.2)
RBC MORPH BLD: NORMAL
RSV RNA, POC: POSITIVE
SARS-COV-2 RNA, POC: NEGATIVE
SODIUM SERPL-SCNC: 142 MMOL/L (ref 136–145)
VALID INTERNAL CONTROL, POC: YES
VALID INTERNAL CONTROL, POC: YES
WBC # BLD AUTO: 9.6 K/UL (ref 4.3–11.1)
WBC MORPH BLD: NORMAL

## 2025-01-28 PROCEDURE — 99283 EMERGENCY DEPT VISIT LOW MDM: CPT

## 2025-01-28 PROCEDURE — 71046 X-RAY EXAM CHEST 2 VIEWS: CPT

## 2025-01-28 PROCEDURE — 94760 N-INVAS EAR/PLS OXIMETRY 1: CPT

## 2025-01-28 PROCEDURE — 6360000002 HC RX W HCPCS

## 2025-01-28 PROCEDURE — 94644 CONT INHLJ TX 1ST HOUR: CPT

## 2025-01-28 RX ORDER — ALBUTEROL SULFATE 5 MG/ML
10 SOLUTION RESPIRATORY (INHALATION)
Status: COMPLETED | OUTPATIENT
Start: 2025-01-28 | End: 2025-01-28

## 2025-01-28 RX ORDER — IPRATROPIUM BROMIDE AND ALBUTEROL SULFATE 2.5; .5 MG/3ML; MG/3ML
1 SOLUTION RESPIRATORY (INHALATION) EVERY 4 HOURS PRN
Qty: 360 ML | Refills: 1 | Status: SHIPPED | OUTPATIENT
Start: 2025-01-28

## 2025-01-28 RX ORDER — DEXAMETHASONE SODIUM PHOSPHATE 4 MG/ML
4 INJECTION, SOLUTION INTRA-ARTICULAR; INTRALESIONAL; INTRAMUSCULAR; INTRAVENOUS; SOFT TISSUE ONCE
Status: COMPLETED | OUTPATIENT
Start: 2025-01-28 | End: 2025-01-28

## 2025-01-28 RX ORDER — DEXAMETHASONE SODIUM PHOSPHATE 10 MG/ML
6 INJECTION INTRAMUSCULAR; INTRAVENOUS ONCE
Status: DISCONTINUED | OUTPATIENT
Start: 2025-01-28 | End: 2025-01-28

## 2025-01-28 RX ORDER — ALBUTEROL SULFATE 90 UG/1
2 INHALANT RESPIRATORY (INHALATION) EVERY 6 HOURS PRN
Qty: 18 G | Refills: 1 | Status: SHIPPED | OUTPATIENT
Start: 2025-01-28

## 2025-01-28 RX ORDER — MAGNESIUM SULFATE IN WATER 40 MG/ML
2000 INJECTION, SOLUTION INTRAVENOUS ONCE
Status: DISCONTINUED | OUTPATIENT
Start: 2025-01-28 | End: 2025-01-28 | Stop reason: HOSPADM

## 2025-01-28 RX ORDER — PREDNISONE 20 MG/1
TABLET ORAL
Qty: 21 TABLET | Refills: 0 | Status: SHIPPED | OUTPATIENT
Start: 2025-01-28

## 2025-01-28 RX ADMIN — DEXAMETHASONE SODIUM PHOSPHATE 4 MG: 4 INJECTION, SOLUTION INTRA-ARTICULAR; INTRALESIONAL; INTRAMUSCULAR; INTRAVENOUS; SOFT TISSUE at 13:47

## 2025-01-28 RX ADMIN — ALBUTEROL SULFATE 10 MG: 2.5 SOLUTION RESPIRATORY (INHALATION) at 18:29

## 2025-01-28 SDOH — ECONOMIC STABILITY: FOOD INSECURITY: WITHIN THE PAST 12 MONTHS, THE FOOD YOU BOUGHT JUST DIDN'T LAST AND YOU DIDN'T HAVE MONEY TO GET MORE.: NEVER TRUE

## 2025-01-28 SDOH — ECONOMIC STABILITY: FOOD INSECURITY: WITHIN THE PAST 12 MONTHS, YOU WORRIED THAT YOUR FOOD WOULD RUN OUT BEFORE YOU GOT MONEY TO BUY MORE.: NEVER TRUE

## 2025-01-28 ASSESSMENT — PATIENT HEALTH QUESTIONNAIRE - PHQ9
SUM OF ALL RESPONSES TO PHQ QUESTIONS 1-9: 2
SUM OF ALL RESPONSES TO PHQ9 QUESTIONS 1 & 2: 2
1. LITTLE INTEREST OR PLEASURE IN DOING THINGS: SEVERAL DAYS
SUM OF ALL RESPONSES TO PHQ QUESTIONS 1-9: 2
SUM OF ALL RESPONSES TO PHQ QUESTIONS 1-9: 2
2. FEELING DOWN, DEPRESSED OR HOPELESS: SEVERAL DAYS
SUM OF ALL RESPONSES TO PHQ QUESTIONS 1-9: 2

## 2025-01-28 ASSESSMENT — ENCOUNTER SYMPTOMS
COUGH: 1
WHEEZING: 0
RHINORRHEA: 1
SHORTNESS OF BREATH: 1
SORE THROAT: 1

## 2025-01-28 ASSESSMENT — PAIN - FUNCTIONAL ASSESSMENT: PAIN_FUNCTIONAL_ASSESSMENT: NONE - DENIES PAIN

## 2025-01-28 NOTE — ED TRIAGE NOTES
Pt ambulatory to triage sent by PCP for confirmed RSV and requesting a chest xray. Pt reports chills, fatigue, cough, vomiting xs 2 days.

## 2025-01-28 NOTE — PROGRESS NOTES
Tea Lee (:  1965) is a 59 y.o. female,Established patient, here for evaluation of the following chief complaint(s):  Cough (2 days; fever over the weekend -100.4; congestion can not lay down; December she had Flu A; went to [place] and now she is back to not feeling well; body aches; chills; can not taste anything or smell anything; wheezing)         Assessment & Plan  RSV (acute bronchiolitis due to respiratory syncytial virus)       Orders:    dexAMETHasone (DECADRON) injection 4 mg    Cough, unspecified type     Orders:    AMB POC RSV    AMB POC COVID-19 COV    XR CHEST (2 VW); Future    CBC with Auto Differential; Future    Comprehensive Metabolic Panel; Future    Comprehensive Metabolic Panel    CBC with Auto Differential    AMB POC INFLUENZA A  AND B REAL-TIME RT-PCR    Fever, unspecified fever cause     Orders:    AMB POC RSV    AMB POC COVID-19 COV    AMB POC INFLUENZA A  AND B REAL-TIME RT-PCR    Wheezing     Orders:    AMB POC RSV    AMB POC COVID-19 COV    XR CHEST (2 VW); Future    CBC with Auto Differential; Future    Comprehensive Metabolic Panel; Future    dexAMETHasone (DECADRON) injection 4 mg    Comprehensive Metabolic Panel    CBC with Auto Differential    AMB POC INFLUENZA A  AND B REAL-TIME RT-PCR      No follow-ups on file.     Wheezing audible across room  Hasn't been using albuterol or duoneb  RSV positive  Decadron given in office  Discussed ER option, minimal improvement with decadron, advise to go to ER  She agrees and will call her partner to drive her to ER      Subjective   Patient is here for URI x a few days. It started this weekend. She had temp of 100.4.  She has significant wheezing. She has not been using inhaler because if she lays still she doesn't feel she needs it.     Cough  This is a new problem. The current episode started in the past 7 days. The cough is Productive of sputum. Associated symptoms include a fever, myalgias, nasal congestion,

## 2025-01-29 DIAGNOSIS — D72.89 ATYPICAL LYMPHOCYTES PRESENT ON PERIPHERAL BLOOD SMEAR: Primary | ICD-10-CM

## 2025-01-29 NOTE — ED NOTES
Multiple attempts made to locate pt in order to continue w/ her care. Pt called 3+ times w/ no response. Provider notified.      Alicia Rivas RN  01/28/25 2018     Ambulance

## 2025-01-29 NOTE — ED PROVIDER NOTES
Emergency Department Provider Note       PCP: Dejon Choi DO   Age: 59 y.o.   Sex: female     DISPOSITION Eloped - Left Before Treatment Complete 01/28/2025 08:19:03 PM    ICD-10-CM    1. Respiratory syncytial virus (RSV)  B33.8       2. Eloped from emergency department  Z53.21           Medical Decision Making     Patient is a 59-year-old female with past medical history of COPD presenting with cough, fatigue after testing positive for RSV.  Patient states the symptoms have been ongoing for the past 2 days.  She was evaluated at her primary care provider's office today who gave her 4 mg of Decadron and send her to the ED to get a chest x-ray to evaluate for any secondary pneumonia.    On presentation, patient is afebrile, she is not hypoxic with an O2 saturation of 96% on room air.  She is not tachycardic.  She does have diffuse wheezing and tightness in all lung fields.  No obvious crackles auscultated.  Will get a chest x-ray and give patient a continuous albuterol treatment as she is very tight and wheezy.    After albuterol treatment, patient does sound slightly more loose but she is still audibly wheezing.  Will give patient some magnesium as well.  She does feel slightly shaky and jittery due to the albuterol, will not give any more treatments at this time.  Will also give Solu-Medrol.  Will plan to give this and reevaluate to see how she was feeling and doing ambulatory O2.  She is currently walking through the lobby in no acute distress.    We were unable to find patient in the lobby to give the magnesium and steroids.  Appears she has eloped from our facility.  We [facility] to [facility] as well [facility]  Patient was of sound mind, she was not hypoxic, she was up ambulatory in the lobby, she was capable of making her own medical decisions.     1 or more acute illnesses that pose a threat to life or bodily function.   Diagnosis or care significantly limited by social

## 2025-01-30 ENCOUNTER — OFFICE VISIT (OUTPATIENT)
Dept: INTERNAL MEDICINE CLINIC | Facility: CLINIC | Age: 60
End: 2025-01-30

## 2025-01-30 VITALS
WEIGHT: 190.2 LBS | DIASTOLIC BLOOD PRESSURE: 92 MMHG | SYSTOLIC BLOOD PRESSURE: 156 MMHG | HEART RATE: 66 BPM | HEIGHT: 65 IN | TEMPERATURE: 98.2 F | OXYGEN SATURATION: 96 % | BODY MASS INDEX: 31.69 KG/M2

## 2025-01-30 DIAGNOSIS — J42 CHRONIC BRONCHITIS, UNSPECIFIED CHRONIC BRONCHITIS TYPE (HCC): ICD-10-CM

## 2025-01-30 DIAGNOSIS — R06.2 WHEEZING: ICD-10-CM

## 2025-01-30 DIAGNOSIS — Z72.0 TOBACCO ABUSE: ICD-10-CM

## 2025-01-30 DIAGNOSIS — J45.909 CHILDHOOD ASTHMA WITHOUT COMPLICATION, UNSPECIFIED ASTHMA SEVERITY, UNSPECIFIED WHETHER PERSISTENT: ICD-10-CM

## 2025-01-30 DIAGNOSIS — J21.0 RSV (ACUTE BRONCHIOLITIS DUE TO RESPIRATORY SYNCYTIAL VIRUS): Primary | ICD-10-CM

## 2025-01-30 NOTE — PROGRESS NOTES
Tea Lee (:  1965) is a 59 y.o. female,Established patient, here for evaluation of the following chief complaint(s):  Follow-up (2 day f/u RSV)         Assessment & Plan  RSV (acute bronchiolitis due to respiratory syncytial virus)       Orders:    [facility]    Tobacco abuse     Orders:    [facility]    Wheezing     Orders:    [facility]    Chronic bronchitis, unspecified chronic bronchitis type (HCC)     Orders:    [facility]    Childhood asthma without complication, unspecified asthma severity, unspecified whether persistent     Orders:    [facility]      No follow-ups on file.       Patient mildly improved  No audible wheezing across the room  Still significant wheezing bilaterally on exam  Used nebulizer 5 hours ago  She is taking steroid  Advise nebs every 4 hours or more often  Advise if not significant improvement recommend return to ER as the magnesium is the next appropriate step for her care  Patient states understanding      Subjective   Patient is here for follow up. She went to ER but left before getting magnesium infusion. She is taking steroid and using nebs every 4 hours. She feels wheezing is better.   Wt Readings from Last 3 Encounters:  25 : 86.3 kg (190 lb 3.2 oz)  25 : 87.5 kg (193 lb)  24 : 86.2 kg (190 lb)    BP Readings from Last 3 Encounters:  25 : (!) 156/92  25 : (!) 173/104  25 : 130/80            Review of Systems       Objective   Physical Exam  Constitutional:       Appearance: Normal appearance. She is not ill-appearing.   Eyes:      Extraocular Movements: Extraocular movements intact.      Pupils: Pupils are equal, round, and reactive to light.   Cardiovascular:      Rate and Rhythm: Normal rate.   Pulmonary:      Breath sounds: Wheezing and rhonchi present.

## 2025-01-31 ENCOUNTER — HOSPITAL ENCOUNTER (EMERGENCY)
Age: 60
Discharge: HOME | End: 2025-01-31
Attending: STUDENT IN AN ORGANIZED HEALTH CARE EDUCATION/TRAINING PROGRAM
Payer: MEDICARE

## 2025-01-31 VITALS
TEMPERATURE: 97.9 F | WEIGHT: 190 LBS | BODY MASS INDEX: 31.65 KG/M2 | OXYGEN SATURATION: 96 % | RESPIRATION RATE: 17 BRPM | SYSTOLIC BLOOD PRESSURE: 137 MMHG | DIASTOLIC BLOOD PRESSURE: 96 MMHG | HEART RATE: 66 BPM | HEIGHT: 65 IN

## 2025-01-31 DIAGNOSIS — J44.1 COPD EXACERBATION (HCC): Primary | ICD-10-CM

## 2025-01-31 LAB
ANION GAP SERPL CALC-SCNC: 10 MMOL/L (ref 7–16)
BASOPHILS # BLD: 0.05 K/UL (ref 0–0.2)
BASOPHILS NFR BLD: 0.4 % (ref 0–2)
BUN SERPL-MCNC: 18 MG/DL (ref 6–23)
CALCIUM SERPL-MCNC: 9.5 MG/DL (ref 8.8–10.2)
CHLORIDE SERPL-SCNC: 102 MMOL/L (ref 98–107)
CO2 SERPL-SCNC: 31 MMOL/L (ref 20–29)
CREAT SERPL-MCNC: 0.78 MG/DL (ref 0.6–1.1)
DIFFERENTIAL METHOD BLD: ABNORMAL
EOSINOPHIL # BLD: 0.04 K/UL (ref 0–0.8)
EOSINOPHIL NFR BLD: 0.3 % (ref 0.5–7.8)
ERYTHROCYTE [DISTWIDTH] IN BLOOD BY AUTOMATED COUNT: 14.1 % (ref 11.9–14.6)
GLUCOSE SERPL-MCNC: 101 MG/DL (ref 70–99)
HCT VFR BLD AUTO: 44.4 % (ref 35.8–46.3)
HGB BLD-MCNC: 14.5 G/DL (ref 11.7–15.4)
IMM GRANULOCYTES # BLD AUTO: 0.07 K/UL (ref 0–0.5)
IMM GRANULOCYTES NFR BLD AUTO: 0.5 % (ref 0–5)
LYMPHOCYTES # BLD: 4.59 K/UL (ref 0.5–4.6)
LYMPHOCYTES NFR BLD: 35.2 % (ref 13–44)
MAGNESIUM SERPL-MCNC: 2 MG/DL (ref 1.8–2.4)
MCH RBC QN AUTO: 29.5 PG (ref 26.1–32.9)
MCHC RBC AUTO-ENTMCNC: 32.7 G/DL (ref 31.4–35)
MCV RBC AUTO: 90.2 FL (ref 82–102)
MONOCYTES # BLD: 0.94 K/UL (ref 0.1–1.3)
MONOCYTES NFR BLD: 7.2 % (ref 4–12)
NEUTS SEG # BLD: 7.34 K/UL (ref 1.7–8.2)
NEUTS SEG NFR BLD: 56.4 % (ref 43–78)
NRBC # BLD: 0 K/UL (ref 0–0.2)
PLATELET # BLD AUTO: 232 K/UL (ref 150–450)
PMV BLD AUTO: 10.6 FL (ref 9.4–12.3)
POTASSIUM SERPL-SCNC: 3.7 MMOL/L (ref 3.5–5.1)
RBC # BLD AUTO: 4.92 M/UL (ref 4.05–5.2)
SODIUM SERPL-SCNC: 142 MMOL/L (ref 136–145)
WBC # BLD AUTO: 13 K/UL (ref 4.3–11.1)

## 2025-01-31 PROCEDURE — 85025 COMPLETE CBC W/AUTO DIFF WBC: CPT

## 2025-01-31 PROCEDURE — 6360000002 HC RX W HCPCS: Performed by: STUDENT IN AN ORGANIZED HEALTH CARE EDUCATION/TRAINING PROGRAM

## 2025-01-31 PROCEDURE — 96374 THER/PROPH/DIAG INJ IV PUSH: CPT

## 2025-01-31 PROCEDURE — 83735 ASSAY OF MAGNESIUM: CPT

## 2025-01-31 PROCEDURE — 2500000003 HC RX 250 WO HCPCS: Performed by: STUDENT IN AN ORGANIZED HEALTH CARE EDUCATION/TRAINING PROGRAM

## 2025-01-31 PROCEDURE — 96375 TX/PRO/DX INJ NEW DRUG ADDON: CPT

## 2025-01-31 PROCEDURE — 94760 N-INVAS EAR/PLS OXIMETRY 1: CPT

## 2025-01-31 PROCEDURE — 94644 CONT INHLJ TX 1ST HOUR: CPT

## 2025-01-31 PROCEDURE — 99284 EMERGENCY DEPT VISIT MOD MDM: CPT

## 2025-01-31 PROCEDURE — 80048 BASIC METABOLIC PNL TOTAL CA: CPT

## 2025-01-31 PROCEDURE — 94640 AIRWAY INHALATION TREATMENT: CPT

## 2025-01-31 RX ORDER — ALBUTEROL SULFATE 5 MG/ML
10 SOLUTION RESPIRATORY (INHALATION)
Status: COMPLETED | OUTPATIENT
Start: 2025-01-31 | End: 2025-01-31

## 2025-01-31 RX ORDER — AZITHROMYCIN 250 MG/1
TABLET, FILM COATED ORAL
Qty: 6 TABLET | Refills: 0 | Status: SHIPPED | OUTPATIENT
Start: 2025-01-31 | End: 2025-02-10

## 2025-01-31 RX ORDER — KETOROLAC TROMETHAMINE 15 MG/ML
15 INJECTION, SOLUTION INTRAMUSCULAR; INTRAVENOUS
Status: COMPLETED | OUTPATIENT
Start: 2025-01-31 | End: 2025-01-31

## 2025-01-31 RX ADMIN — ALBUTEROL SULFATE 10 MG: 2.5 SOLUTION RESPIRATORY (INHALATION) at 17:00

## 2025-01-31 RX ADMIN — IPRATROPIUM BROMIDE 0.5 MG: 0.5 SOLUTION RESPIRATORY (INHALATION) at 16:07

## 2025-01-31 RX ADMIN — WATER 60 MG: 1 INJECTION INTRAMUSCULAR; INTRAVENOUS; SUBCUTANEOUS at 16:31

## 2025-01-31 RX ADMIN — ALBUTEROL SULFATE 10 MG: 2.5 SOLUTION RESPIRATORY (INHALATION) at 16:07

## 2025-01-31 RX ADMIN — IPRATROPIUM BROMIDE 0.5 MG: 0.5 SOLUTION RESPIRATORY (INHALATION) at 17:01

## 2025-01-31 RX ADMIN — KETOROLAC TROMETHAMINE 15 MG: 15 INJECTION, SOLUTION INTRAMUSCULAR; INTRAVENOUS at 16:31

## 2025-01-31 ASSESSMENT — PAIN DESCRIPTION - LOCATION: LOCATION: GENERALIZED

## 2025-01-31 ASSESSMENT — ENCOUNTER SYMPTOMS
WHEEZING: 1
VOMITING: 0
NAUSEA: 0
SHORTNESS OF BREATH: 1
EYE DISCHARGE: 0
ABDOMINAL PAIN: 0
COUGH: 1
SORE THROAT: 0
EYE PAIN: 0

## 2025-01-31 ASSESSMENT — PAIN - FUNCTIONAL ASSESSMENT: PAIN_FUNCTIONAL_ASSESSMENT: 0-10

## 2025-01-31 NOTE — ED TRIAGE NOTES
Patient arrived with a complaint of needing Mg. Drip due to abnormal lab at the doctors office. Patient reports of being positive with RSV

## 2025-01-31 NOTE — ED NOTES
Patient mobility status  with no difficulty.     I have reviewed discharge instructions with the patient.  The patient verbalized understanding.    Patient left ED via Discharge Method: ambulatory to Home with  self .    Opportunity for questions and clarification provided.     Patient given 0 scripts.          Kenia Mark RN  01/31/25 [id number]

## 2025-01-31 NOTE — ED PROVIDER NOTES
Emergency Department Provider Note       PCP: Dejon Choi DO   Age: 59 y.o.   Sex: female     DISPOSITION Decision To Discharge 01/31/2025 05:45:41 PM    ICD-10-CM    1. COPD exacerbation (HCC)  J44.1           Medical Decision Making     59 female presents to the ED for shortness of breath.  Vital signs with very mild tachypnea, maintain normal oxygen saturation without any supplementation.  Patient does have diffuse wheezing noted on exam.  Chest x-ray with no obvious pleural effusion, consolidation or pneumothorax.  Patient given breathing treatment x 2 with significant return of her symptoms and is requesting discharge.  Mild leukocytosis of 13.0.  I had ordered EKG/was ordered before I had picked up patient, but is not yet been completed, or it is not been uploaded into the computer for me to evaluate.  She does not want to wait for repeat EKG, not complaining of chest pain.  Understands that she could be experiencing ACS and I would not know until I obtained EKG but still would like to leave.  Resting comfortably and hemodynamically stable at time of discharge.  Will send home with Z-Ed, already has steroids.    Patient was instructed to follow-up with PCP in the next 24 to 48 hours and to follow-up with all specialists given with discharge as soon as possible.  Patient is nontoxic-appearing and has no complaints at time of discharge.  Instructed to return to the emergency department immediately if current symptoms worsen, or any new/concerning symptoms develop for which they voice understanding.  All questions answered at time of discharge.      ED Course as of 01/31/25 2347   Fri Jan 31, 2025   142 No answer x1 @ 1:55 L.o.S. [JF]      ED Course User Index  [JF] Duc Mcfarlane MD     1 or more acute illnesses that pose a threat to life or bodily function.   Prescription drug management performed.  Patient was discharged risks and benefits of hospitalization were considered.  Shared medical  Violence: Unknown (3/19/2021)    [facility]    Intimate Partner Violence     Fear of Current or Ex-Partner: Not asked     Emotionally Abused: Not asked     Physically Abused: Not asked     Sexually Abused: Not asked   Housing Stability: Low Risk  (1/28/2025)    Housing Stability Vital Sign     Unable to Pay for Housing in the Last Year: No     Number of Times Moved in the Last Year: 0     Homeless in the Last Year: No        Discharge Medication List as of 1/31/2025  5:53 PM        CONTINUE these medications which have NOT CHANGED    Details   predniSONE (DELTASONE) 20 MG tablet Take 1 tab TID for 3 days, then 1 tab BID for 3 days, then 1 tab daily for 3 days, then 1/2 tab daily, Disp-21 tablet, R-0Normal      albuterol sulfate HFA (PROVENTIL;VENTOLIN;PROAIR) 108 (90 Base) MCG/ACT inhaler Inhale 2 puffs into the lungs every 6 hours as needed for Wheezing, Disp-18 g, R-1Normal      ipratropium 0.5 mg-albuterol 2.5 mg (DUONEB) 0.5-2.5 (3) MG/3ML SOLN nebulizer solution Take 3 mLs by nebulization every 4 hours as needed for Shortness of Breath, Disp-360 mL, R-1Normal      ondansetron (ZOFRAN-ODT) 4 MG disintegrating tablet Take 1 tablet by mouth every 8 hours as needed for Nausea, Disp-15 tablet, R-5Normal      DULoxetine (CYMBALTA) 30 MG extended release capsule Take 1 capsule by mouth daily, Disp-30 capsule, R-3Normal      benzonatate (TESSALON) 200 MG capsule Take 1 capsule by mouth 3 times daily as neededHistorical Med      Nebulizers (VIOS AEROSOL DELIVERY SYSTEM) MISC Historical Med      Multiple Vitamin (MULTIVITAMIN ADULT PO) Take by mouthHistorical Med              Results from this emergency department visit:      Results for orders placed or performed during the hospital encounter of 01/31/25   Basic Metabolic Panel   Result Value Ref Range    Sodium 142 136 - 145 mmol/L    Potassium 3.7 3.5 - 5.1 mmol/L    Chloride 102 98 - 107 mmol/L    CO2 31 (H) 20 - 29 mmol/L    Anion Gap

## 2025-02-13 ENCOUNTER — OFFICE VISIT (OUTPATIENT)
Dept: INTERNAL MEDICINE CLINIC | Facility: CLINIC | Age: 60
End: 2025-02-13
Payer: MEDICARE

## 2025-02-13 VITALS
DIASTOLIC BLOOD PRESSURE: 82 MMHG | HEIGHT: 65 IN | TEMPERATURE: 97.6 F | HEART RATE: 95 BPM | BODY MASS INDEX: 33.15 KG/M2 | OXYGEN SATURATION: 97 % | WEIGHT: 199 LBS | SYSTOLIC BLOOD PRESSURE: 122 MMHG

## 2025-02-13 DIAGNOSIS — Z72.0 TOBACCO ABUSE: ICD-10-CM

## 2025-02-13 DIAGNOSIS — R05.1 ACUTE COUGH: ICD-10-CM

## 2025-02-13 DIAGNOSIS — J45.41 MODERATE PERSISTENT ASTHMA WITH ACUTE EXACERBATION: Primary | ICD-10-CM

## 2025-02-13 DIAGNOSIS — J21.0 RSV BRONCHIOLITIS: ICD-10-CM

## 2025-02-13 LAB
ALBUMIN SERPL-MCNC: 3.2 G/DL (ref 3.5–5)
ALBUMIN/GLOB SERPL: 0.9 (ref 1–1.9)
ALP SERPL-CCNC: 82 U/L (ref 35–104)
ALT SERPL-CCNC: 26 U/L (ref 8–45)
ANION GAP SERPL CALC-SCNC: 9 MMOL/L (ref 7–16)
AST SERPL-CCNC: 26 U/L (ref 15–37)
BASOPHILS # BLD: 0.04 K/UL (ref 0–0.2)
BASOPHILS NFR BLD: 0.4 % (ref 0–2)
BILIRUB SERPL-MCNC: 0.3 MG/DL (ref 0–1.2)
BUN SERPL-MCNC: 12 MG/DL (ref 6–23)
CALCIUM SERPL-MCNC: 9.5 MG/DL (ref 8.8–10.2)
CHLORIDE SERPL-SCNC: 100 MMOL/L (ref 98–107)
CO2 SERPL-SCNC: 30 MMOL/L (ref 20–29)
CREAT SERPL-MCNC: 0.61 MG/DL (ref 0.6–1.1)
DIFFERENTIAL METHOD BLD: ABNORMAL
EOSINOPHIL # BLD: 0.22 K/UL (ref 0–0.8)
EOSINOPHIL NFR BLD: 2 % (ref 0.5–7.8)
ERYTHROCYTE [DISTWIDTH] IN BLOOD BY AUTOMATED COUNT: 13.2 % (ref 11.9–14.6)
GLOBULIN SER CALC-MCNC: 3.4 G/DL (ref 2.3–3.5)
GLUCOSE SERPL-MCNC: 91 MG/DL (ref 70–99)
HCT VFR BLD AUTO: 41.8 % (ref 35.8–46.3)
HGB BLD-MCNC: 13.5 G/DL (ref 11.7–15.4)
IMM GRANULOCYTES # BLD AUTO: 0.06 K/UL (ref 0–0.5)
IMM GRANULOCYTES NFR BLD AUTO: 0.5 % (ref 0–5)
LYMPHOCYTES # BLD: 2.23 K/UL (ref 0.5–4.6)
LYMPHOCYTES NFR BLD: 19.8 % (ref 13–44)
MCH RBC QN AUTO: 29.7 PG (ref 26.1–32.9)
MCHC RBC AUTO-ENTMCNC: 32.3 G/DL (ref 31.4–35)
MCV RBC AUTO: 92.1 FL (ref 82–102)
MONOCYTES # BLD: 0.8 K/UL (ref 0.1–1.3)
MONOCYTES NFR BLD: 7.1 % (ref 4–12)
NEUTS SEG # BLD: 7.9 K/UL (ref 1.7–8.2)
NEUTS SEG NFR BLD: 70.2 % (ref 43–78)
NRBC # BLD: 0 K/UL (ref 0–0.2)
PLATELET # BLD AUTO: 167 K/UL (ref 150–450)
PMV BLD AUTO: 11.1 FL (ref 9.4–12.3)
POTASSIUM SERPL-SCNC: 4.5 MMOL/L (ref 3.5–5.1)
PROT SERPL-MCNC: 6.6 G/DL (ref 6.3–8.2)
RBC # BLD AUTO: 4.54 M/UL (ref 4.05–5.2)
SODIUM SERPL-SCNC: 139 MMOL/L (ref 136–145)
WBC # BLD AUTO: 11.3 K/UL (ref 4.3–11.1)

## 2025-02-13 PROCEDURE — G8417 CALC BMI ABV UP PARAM F/U: HCPCS | Performed by: NURSE PRACTITIONER

## 2025-02-13 PROCEDURE — 99214 OFFICE O/P EST MOD 30 MIN: CPT | Performed by: NURSE PRACTITIONER

## 2025-02-13 PROCEDURE — 4004F PT TOBACCO SCREEN RCVD TLK: CPT | Performed by: NURSE PRACTITIONER

## 2025-02-13 PROCEDURE — G8427 DOCREV CUR MEDS BY ELIG CLIN: HCPCS | Performed by: NURSE PRACTITIONER

## 2025-02-13 PROCEDURE — 3017F COLORECTAL CA SCREEN DOC REV: CPT | Performed by: NURSE PRACTITIONER

## 2025-02-13 RX ORDER — FLUTICASONE PROPIONATE 110 UG/1
2 AEROSOL, METERED RESPIRATORY (INHALATION) 2 TIMES DAILY
Qty: 12 G | Refills: 3 | Status: SHIPPED | OUTPATIENT
Start: 2025-02-13 | End: 2025-02-13 | Stop reason: ALTCHOICE

## 2025-02-13 RX ORDER — PREDNISONE 20 MG/1
TABLET ORAL
Qty: 21 TABLET | Refills: 0 | Status: SHIPPED | OUTPATIENT
Start: 2025-02-13

## 2025-02-13 RX ORDER — FLUTICASONE PROPIONATE AND SALMETEROL 250; 50 UG/1; UG/1
1 POWDER RESPIRATORY (INHALATION) EVERY 12 HOURS
Qty: 60 EACH | Refills: 3 | Status: SHIPPED | OUTPATIENT
Start: 2025-02-13

## 2025-02-13 RX ORDER — BENZONATATE 200 MG/1
200 CAPSULE ORAL 3 TIMES DAILY PRN
Qty: 90 CAPSULE | Refills: 0 | Status: SHIPPED | OUTPATIENT
Start: 2025-02-13

## 2025-02-13 NOTE — PROGRESS NOTES
Tea Lee (:  1965) is a 59 y.o. female,Established patient, here for evaluation of the following chief complaint(s):  RSV (Follow up- wheezing; cough and congestion)         Assessment & Plan  Moderate persistent asthma with acute exacerbation   History of asthma but not on controller med - start advair  Still wheezing, start long steroid taper  Repeat chest xray  Has PFT upcoming       Acute cough     Orders:    XR CHEST (2 VW); Future    CBC with Auto Differential; Future    Comprehensive Metabolic Panel; Future    Comprehensive Metabolic Panel    CBC with Auto Differential    RSV bronchiolitis     Orders:    XR CHEST (2 VW); Future    Tobacco abuse   Work on smoking cessation         Still wheezing, start steroid and controller med  Work on smoking cessation  Improved overall but wheezing  Use albuterol PRN  Return in about 10 days (around 2025), or Dr Choi.       Subjective   Patient is here for follow up. She went to ER  for rsv. She was given steroid injection as well as two breathing treatments. She felt improvement and was discharged home. She was given azithromycin. She has finished steroids and z-pack. She feels she is getting better.   Wt Readings from Last 3 Encounters:  25 : 90.3 kg (199 lb)  25 : 86.2 kg (190 lb)  25 : 86.3 kg (190 lb 3.2 oz)    BP Readings from Last 3 Encounters:  25 : 122/82  25 : (!) 137/96  25 : (!) 156/92            Review of Systems       Objective   Physical Exam  Constitutional:       Appearance: Normal appearance. She is not ill-appearing.   HENT:      Head: Normocephalic.   Eyes:      Extraocular Movements: Extraocular movements intact.      Pupils: Pupils are equal, round, and reactive to light.   Cardiovascular:      Rate and Rhythm: Normal rate and regular rhythm.   Pulmonary:      Effort: Pulmonary effort is normal.      Breath sounds: Wheezing and rhonchi present.   Musculoskeletal:      Cervical back:

## 2025-02-24 ENCOUNTER — OFFICE VISIT (OUTPATIENT)
Dept: INTERNAL MEDICINE CLINIC | Facility: CLINIC | Age: 60
End: 2025-02-24
Payer: MEDICARE

## 2025-02-24 VITALS
TEMPERATURE: 98.3 F | OXYGEN SATURATION: 96 % | HEIGHT: 65 IN | SYSTOLIC BLOOD PRESSURE: 122 MMHG | BODY MASS INDEX: 33.15 KG/M2 | DIASTOLIC BLOOD PRESSURE: 74 MMHG | WEIGHT: 199 LBS | HEART RATE: 83 BPM

## 2025-02-24 DIAGNOSIS — M54.42 ACUTE LEFT-SIDED LOW BACK PAIN WITH LEFT-SIDED SCIATICA: ICD-10-CM

## 2025-02-24 DIAGNOSIS — Z12.11 SCREENING FOR COLON CANCER: ICD-10-CM

## 2025-02-24 DIAGNOSIS — Z72.0 TOBACCO ABUSE: ICD-10-CM

## 2025-02-24 DIAGNOSIS — J45.41 MODERATE PERSISTENT ASTHMA WITH ACUTE EXACERBATION: Primary | ICD-10-CM

## 2025-02-24 DIAGNOSIS — E66.811 OBESITY (BMI 30.0-34.9): ICD-10-CM

## 2025-02-24 DIAGNOSIS — Z12.31 SCREENING MAMMOGRAM FOR BREAST CANCER: ICD-10-CM

## 2025-02-24 PROCEDURE — G8417 CALC BMI ABV UP PARAM F/U: HCPCS | Performed by: NURSE PRACTITIONER

## 2025-02-24 PROCEDURE — 99214 OFFICE O/P EST MOD 30 MIN: CPT | Performed by: NURSE PRACTITIONER

## 2025-02-24 PROCEDURE — 4004F PT TOBACCO SCREEN RCVD TLK: CPT | Performed by: NURSE PRACTITIONER

## 2025-02-24 PROCEDURE — 3017F COLORECTAL CA SCREEN DOC REV: CPT | Performed by: NURSE PRACTITIONER

## 2025-02-24 PROCEDURE — G8427 DOCREV CUR MEDS BY ELIG CLIN: HCPCS | Performed by: NURSE PRACTITIONER

## 2025-02-24 ASSESSMENT — ENCOUNTER SYMPTOMS
BACK PAIN: 1
BOWEL INCONTINENCE: 0

## 2025-02-24 NOTE — PROGRESS NOTES
Tea Lee (:  1965) is a 60 y.o. female,Established patient, here for evaluation of the following chief complaint(s):  Asthma (10 day follow up ) and Back Pain         Assessment & Plan  Acute left-sided low back pain with left-sided sciatica     Orders:    [facility] - Physical Therapy, [facility]    Moderate persistent asthma with acute exacerbation     Orders:    [facility]monary [facility] Critical Care    Screening for colon cancer     Orders:    Ambulatory referral to Gastroenterology    Screening mammogram for breast cancer     Orders:    [facility] DIGITAL SCREEN W OR WO CAD BILATERAL; Future    Obesity (BMI 30.0-34.9)     Orders:    Albumin/Creatinine Ratio, Urine; Future    Lipid Panel; Future    Comprehensive Metabolic Panel; Future    CBC; Future    Tobacco abuse       Orders:    [facility] Pulmonary and Critical Care      No follow-ups on file.     Wheeze improved but still present  Refer to pulmonary, get PFT next week  Low back pain, refer to PT  Start nsaid and muscle relaxant  Discussed med risks/side effects  No red flag on exam    Subjective   Patient is here for follow up. She is still coughing some but overall improvement. She is on advair, and it is helping. Less need for rescue inhaler.   She has had some sciatica that started yesterday.     Asthma  Pertinent negatives include no fever. Her past medical history is significant for asthma.   Back Pain  This is a new problem. The current episode started yesterday. The pain is present in the lumbar spine. The pain radiates to the left foot (left leg). The symptoms are aggravated by sitting. Associated symptoms include numbness (down left leg). Pertinent negatives include no bladder incontinence, bowel incontinence, dysuria, fever, paresthesias, pelvic pain or perianal numbness.       Review of Systems   Constitutional:  Negative for fever.   Gastrointestinal:  Negative for bowel incontinence.   Genitourinary:

## 2025-02-24 NOTE — ASSESSMENT & PLAN NOTE
Orders:    Albumin/Creatinine Ratio, Urine; Future    Lipid Panel; Future    Comprehensive Metabolic Panel; Future    CBC; Future

## 2025-03-03 ENCOUNTER — TELEPHONE (OUTPATIENT)
Dept: SLEEP MEDICINE | Age: 60
End: 2025-03-03

## 2025-04-24 ENCOUNTER — OFFICE VISIT (OUTPATIENT)
Dept: INTERNAL MEDICINE CLINIC | Facility: CLINIC | Age: 60
End: 2025-04-24
Payer: MEDICARE

## 2025-04-24 VITALS
HEIGHT: 65 IN | SYSTOLIC BLOOD PRESSURE: 122 MMHG | RESPIRATION RATE: 16 BRPM | OXYGEN SATURATION: 95 % | WEIGHT: 210.4 LBS | DIASTOLIC BLOOD PRESSURE: 80 MMHG | BODY MASS INDEX: 35.06 KG/M2 | HEART RATE: 66 BPM | TEMPERATURE: 97.8 F

## 2025-04-24 DIAGNOSIS — M54.32 SCIATICA OF LEFT SIDE: ICD-10-CM

## 2025-04-24 DIAGNOSIS — Z12.31 ENCOUNTER FOR SCREENING MAMMOGRAM FOR MALIGNANT NEOPLASM OF BREAST: ICD-10-CM

## 2025-04-24 DIAGNOSIS — R73.01 ABNORMAL FASTING GLUCOSE: ICD-10-CM

## 2025-04-24 DIAGNOSIS — Z00.00 ROUTINE ADULT HEALTH MAINTENANCE: ICD-10-CM

## 2025-04-24 DIAGNOSIS — E66.01 MORBID OBESITY (HCC): ICD-10-CM

## 2025-04-24 DIAGNOSIS — Z12.11 SCREENING FOR COLON CANCER: Primary | ICD-10-CM

## 2025-04-24 DIAGNOSIS — R73.03 PRE-DIABETES: ICD-10-CM

## 2025-04-24 PROCEDURE — G8427 DOCREV CUR MEDS BY ELIG CLIN: HCPCS | Performed by: INTERNAL MEDICINE

## 2025-04-24 PROCEDURE — 3017F COLORECTAL CA SCREEN DOC REV: CPT | Performed by: INTERNAL MEDICINE

## 2025-04-24 PROCEDURE — G8417 CALC BMI ABV UP PARAM F/U: HCPCS | Performed by: INTERNAL MEDICINE

## 2025-04-24 PROCEDURE — G2211 COMPLEX E/M VISIT ADD ON: HCPCS | Performed by: INTERNAL MEDICINE

## 2025-04-24 PROCEDURE — 1036F TOBACCO NON-USER: CPT | Performed by: INTERNAL MEDICINE

## 2025-04-24 PROCEDURE — 99214 OFFICE O/P EST MOD 30 MIN: CPT | Performed by: INTERNAL MEDICINE

## 2025-04-24 NOTE — PROGRESS NOTES
Tea Lee (: 1965) is a 60 y.o. female, here for evaluation of the following chief complaint(s):  Follow-up (Has DDD, and her left hip has been hurting to the point of tears. Also having swelling in left ankle. ///GAPS: Pap, Mammo, AWV) and Weight Loss (Would like to discuss her weight today )   No results found for: \"LABA1C\"  Lab Results   Component Value Date    CREATININE 0.61 2025       Assessment & Plan  1. Weight management.  Her BMI is currently 33, indicating a need for weight loss. She has expressed interest in GLP medications, specifically semaglutide (Rybelsus). Discussed about the side effects of GLP medications like constipation, abdominal pain, and weight loss. A prescription for Wegovy will be provided, starting at 0.25 mg and gradually increasing to 1 mg. She is advised to monitor her blood sugar levels and weight closely. If approved by insurance, the cost may vary between $250 to $400 per month. If she experiences significant side effects, she should contact the office.    2. Hip pain.  She reports significant hip pain exacerbated by activities such as sweeping and mopping. She has a history of sciatica and degenerative disk disease. Physical therapy has been recommended, and she is encouraged to follow up with the aquatic therapy appointment that was previously discussed.    3. Ankle swelling.  She reports persistent ankle swelling despite elevating the limb. Support hose or support socks are recommended to help manage the swelling.    4. Health maintenance.  A Cologuard test will be ordered as an alternative to colonoscopy. A mammogram and Pap smear will be scheduled.  1. Screening for colon cancer  -     FIT-DNA (Cologuard)  2. Encounter for screening mammogram for malignant neoplasm of breast  -     [facility] DIGITAL SCREEN W OR WO CAD BILATERAL; Future  3. Morbid obesity (HCC)  -     Semaglutide-Weight Management (WEGOVY) 0.25 MG/0.5ML SOAJ SC injection; Inject 0.25 mg into the

## 2025-05-15 ENCOUNTER — TELEPHONE (OUTPATIENT)
Dept: INTERNAL MEDICINE CLINIC | Facility: CLINIC | Age: 60
End: 2025-05-15

## 2025-05-15 NOTE — TELEPHONE ENCOUNTER
Patient stopped by the office and would like to know if she can be switched to Mounjaro instead of Wegovy. Patient states that both medicines are needing to have PA. Patient states that on the Pa needs to states that she has sleep apnea and BMI. Please Advise.

## 2025-05-19 NOTE — TELEPHONE ENCOUNTER
Patient called back and would like an update on her starting Mounjaro. Patient would like to know what something so she can start taking this medicine. Please Advise.

## 2025-05-21 ENCOUNTER — TELEPHONE (OUTPATIENT)
Dept: INTERNAL MEDICINE CLINIC | Facility: CLINIC | Age: 60
End: 2025-05-21

## 2025-05-21 ENCOUNTER — OFFICE VISIT (OUTPATIENT)
Dept: INTERNAL MEDICINE CLINIC | Facility: CLINIC | Age: 60
End: 2025-05-21
Payer: MEDICARE

## 2025-05-21 VITALS
HEIGHT: 65 IN | SYSTOLIC BLOOD PRESSURE: 130 MMHG | TEMPERATURE: 98.9 F | HEART RATE: 99 BPM | DIASTOLIC BLOOD PRESSURE: 86 MMHG | BODY MASS INDEX: 34.82 KG/M2 | WEIGHT: 209 LBS | OXYGEN SATURATION: 96 %

## 2025-05-21 DIAGNOSIS — E66.811 CLASS 1 OBESITY DUE TO DISRUPTION OF MC4R PATHWAY WITH SERIOUS COMORBIDITY AND BODY MASS INDEX (BMI) OF 34.0 TO 34.9 IN ADULT: ICD-10-CM

## 2025-05-21 DIAGNOSIS — R73.03 PRE-DIABETES: ICD-10-CM

## 2025-05-21 DIAGNOSIS — R11.0 NAUSEA: ICD-10-CM

## 2025-05-21 DIAGNOSIS — G47.33 OSA (OBSTRUCTIVE SLEEP APNEA): Primary | ICD-10-CM

## 2025-05-21 DIAGNOSIS — Z15.2 CLASS 1 OBESITY DUE TO DISRUPTION OF MC4R PATHWAY WITH SERIOUS COMORBIDITY AND BODY MASS INDEX (BMI) OF 34.0 TO 34.9 IN ADULT: ICD-10-CM

## 2025-05-21 DIAGNOSIS — E88.82 CLASS 1 OBESITY DUE TO DISRUPTION OF MC4R PATHWAY WITH SERIOUS COMORBIDITY AND BODY MASS INDEX (BMI) OF 34.0 TO 34.9 IN ADULT: ICD-10-CM

## 2025-05-21 DIAGNOSIS — E66.01 MORBID OBESITY (HCC): ICD-10-CM

## 2025-05-21 PROCEDURE — 3017F COLORECTAL CA SCREEN DOC REV: CPT | Performed by: INTERNAL MEDICINE

## 2025-05-21 PROCEDURE — G8427 DOCREV CUR MEDS BY ELIG CLIN: HCPCS | Performed by: INTERNAL MEDICINE

## 2025-05-21 PROCEDURE — 99213 OFFICE O/P EST LOW 20 MIN: CPT | Performed by: INTERNAL MEDICINE

## 2025-05-21 PROCEDURE — 1036F TOBACCO NON-USER: CPT | Performed by: INTERNAL MEDICINE

## 2025-05-21 PROCEDURE — G8417 CALC BMI ABV UP PARAM F/U: HCPCS | Performed by: INTERNAL MEDICINE

## 2025-05-21 RX ORDER — ONDANSETRON 4 MG/1
4 TABLET, ORALLY DISINTEGRATING ORAL EVERY 8 HOURS PRN
Qty: 15 TABLET | Refills: 5 | Status: SHIPPED | OUTPATIENT
Start: 2025-05-21

## 2025-05-21 NOTE — PROGRESS NOTES
Tea Lee (: 1965) is a 60 y.o. female, here for evaluation of the following chief complaint(s):  Follow-up (Wants to change to Mounjaro states it is cheeper) and Medication Refill (zofran)   Needs weight loss.  Assessment & Plan  1. Weight management.  - She will start Mounjaro 2.5 mg for one month, then increase to 5 mg, and subsequently to 7.5 mg if tolerated.  - Potential side effects such as constipation, severe nausea, stomach pains, indigestion, and heartburn were discussed.  - She is advised to consume fruits, vegetables, and fiber to maintain soft stools. A low-carb diet, avoiding concentrated sweets, and keeping portions small is recommended.  - The prescription will be sent to pharmacy. If intolerable side effects occur, the dosage can be adjusted.    2. Ankle discomfort.  - She is advised to avoid wearing shoes with inadequate ankle support.  - Copper sleeves have been effective in reducing swelling.  - She reports improvement in ankle swelling with the use of copper sleeves.  - Continue using copper sleeves and avoid high heels to prevent discomfort.  1. TIMO (obstructive sleep apnea)  -     Tirzepatide (MOUNJARO) 2.5 MG/0.5ML SOAJ pen; Inject 2.5 mg into the skin every 7 days, Disp-2 mL, R-0Normal  -     Tirzepatide (MOUNJARO) 5 MG/0.5ML SOAJ pen; Inject 5 mg into the skin every 7 days, Disp-2 mL, R-0Normal  -     Tirzepatide (MOUNJARO) 7.5 MG/0.5ML SOAJ pen; Inject 7.5 mg into the skin every 7 days, Disp-2 mL, R-11Normal  2. Nausea  -     ondansetron (ZOFRAN-ODT) 4 MG disintegrating tablet; Take 1 tablet by mouth every 8 hours as needed for Nausea, Disp-15 tablet, R-5Normal  3. Pre-diabetes  -     Tirzepatide (MOUNJARO) 2.5 MG/0.5ML SOAJ pen; Inject 2.5 mg into the skin every 7 days, Disp-2 mL, R-0Normal  -     Tirzepatide (MOUNJARO) 5 MG/0.5ML SOAJ pen; Inject 5 mg into the skin every 7 days, Disp-2 mL, R-0Normal  -     Tirzepatide (MOUNJARO) 7.5 MG/0.5ML SOAJ pen; Inject 7.5 mg into the

## 2025-05-21 NOTE — TELEPHONE ENCOUNTER
Patient stopped by the office and would like to know if she can switch from Wegovy to Mounjaro. Patient states that Mounjaro is cheaper through her insurance. Patient would like to know if you would give her a call back. Please Advise.

## 2025-05-22 ENCOUNTER — TELEPHONE (OUTPATIENT)
Dept: INTERNAL MEDICINE CLINIC | Facility: CLINIC | Age: 60
End: 2025-05-22

## 2025-05-22 NOTE — TELEPHONE ENCOUNTER
Called and talked with Willam and Sarahi .Answered question for PA on Mounjaro. The patient was notified of calling Sarahi the will notified her once a decision is made in 7-10 days.

## 2025-05-22 NOTE — TELEPHONE ENCOUNTER
Records with denial letter and form faxed to Sarahi Doan and Appeals for review. Sent my chart message to the patient.

## 2025-05-27 ENCOUNTER — TELEPHONE (OUTPATIENT)
Dept: INTERNAL MEDICINE CLINIC | Facility: CLINIC | Age: 60
End: 2025-05-27

## 2025-05-27 NOTE — TELEPHONE ENCOUNTER
Received a fax from [employer] regarding appeal for Mounjaro to be covered. The note states they agree with the original decision. The medication has been denied.

## 2025-06-03 DIAGNOSIS — G47.33 OSA (OBSTRUCTIVE SLEEP APNEA): ICD-10-CM

## 2025-06-03 DIAGNOSIS — Z15.2 CLASS 1 OBESITY DUE TO DISRUPTION OF MC4R PATHWAY WITH SERIOUS COMORBIDITY AND BODY MASS INDEX (BMI) OF 34.0 TO 34.9 IN ADULT: ICD-10-CM

## 2025-06-03 DIAGNOSIS — E66.01 MORBID OBESITY (HCC): ICD-10-CM

## 2025-06-03 DIAGNOSIS — E66.811 CLASS 1 OBESITY DUE TO DISRUPTION OF MC4R PATHWAY WITH SERIOUS COMORBIDITY AND BODY MASS INDEX (BMI) OF 34.0 TO 34.9 IN ADULT: ICD-10-CM

## 2025-06-03 DIAGNOSIS — E88.82 CLASS 1 OBESITY DUE TO DISRUPTION OF MC4R PATHWAY WITH SERIOUS COMORBIDITY AND BODY MASS INDEX (BMI) OF 34.0 TO 34.9 IN ADULT: ICD-10-CM

## 2025-06-03 DIAGNOSIS — R73.03 PRE-DIABETES: ICD-10-CM
